# Patient Record
Sex: MALE | Race: WHITE | NOT HISPANIC OR LATINO | Employment: FULL TIME | URBAN - METROPOLITAN AREA
[De-identification: names, ages, dates, MRNs, and addresses within clinical notes are randomized per-mention and may not be internally consistent; named-entity substitution may affect disease eponyms.]

---

## 2019-01-21 ENCOUNTER — OFFICE VISIT (OUTPATIENT)
Dept: URGENT CARE | Facility: CLINIC | Age: 34
End: 2019-01-21
Payer: COMMERCIAL

## 2019-01-21 VITALS
BODY MASS INDEX: 33.57 KG/M2 | SYSTOLIC BLOOD PRESSURE: 120 MMHG | RESPIRATION RATE: 16 BRPM | OXYGEN SATURATION: 97 % | TEMPERATURE: 98.1 F | DIASTOLIC BLOOD PRESSURE: 76 MMHG | WEIGHT: 270 LBS | HEART RATE: 76 BPM | HEIGHT: 75 IN

## 2019-01-21 DIAGNOSIS — M54.42 ACUTE LEFT-SIDED LOW BACK PAIN WITH LEFT-SIDED SCIATICA: ICD-10-CM

## 2019-01-21 PROCEDURE — 99204 OFFICE O/P NEW MOD 45 MIN: CPT | Performed by: PHYSICIAN ASSISTANT

## 2019-01-21 RX ORDER — KETOROLAC TROMETHAMINE 30 MG/ML
60 INJECTION, SOLUTION INTRAMUSCULAR; INTRAVENOUS ONCE
Status: COMPLETED | OUTPATIENT
Start: 2019-01-21 | End: 2019-01-21

## 2019-01-21 RX ORDER — METHYLPREDNISOLONE 4 MG/1
TABLET ORAL
Qty: 1 KIT | Refills: 0 | Status: SHIPPED | OUTPATIENT
Start: 2019-01-21 | End: 2019-01-29

## 2019-01-21 RX ORDER — HYDROCODONE BITARTRATE AND ACETAMINOPHEN 5; 325 MG/1; MG/1
1 TABLET ORAL EVERY 4 HOURS PRN
Qty: 18 TAB | Refills: 0 | Status: SHIPPED | OUTPATIENT
Start: 2019-01-21 | End: 2019-01-24

## 2019-01-21 RX ADMIN — KETOROLAC TROMETHAMINE 60 MG: 30 INJECTION, SOLUTION INTRAMUSCULAR; INTRAVENOUS at 08:50

## 2019-01-21 ASSESSMENT — ENCOUNTER SYMPTOMS
BOWEL INCONTINENCE: 0
PERIANAL NUMBNESS: 0
SHORTNESS OF BREATH: 0
FEVER: 0
HEADACHES: 0
SENSORY CHANGE: 0
BACK PAIN: 1
TINGLING: 0
TREMORS: 0
COUGH: 0
PALPITATIONS: 0
SEIZURES: 0
CHILLS: 0
SPEECH CHANGE: 0
FOCAL WEAKNESS: 0
NUMBNESS: 0
LOSS OF CONSCIOUSNESS: 0
BLURRED VISION: 0
DIZZINESS: 0
DOUBLE VISION: 0
LEG PAIN: 1

## 2019-01-21 NOTE — LETTER
January 21, 2019         Patient: Jesus Reynoso   YOB: 1985   Date of Visit: 1/21/2019           To Whom it May Concern:    Jesus Reynoso was seen in my clinic on 1/21/2019.  Please excuse him from work this week.  If you have any questions or concerns, please don't hesitate to call.        Sincerely,           Maximino Gonsales P.A.-C.  Electronically Signed

## 2019-01-21 NOTE — PROGRESS NOTES
Subjective:      Jesus Reynoso is a 33 y.o. male who presents with Back Pain (x3 weeks, last week has gotten worse, severe pain in lower back that shoots down legs, no injury)            Back Pain   This is a new problem. The current episode started 1 to 4 weeks ago. The problem occurs constantly. The problem is unchanged. The pain is present in the lumbar spine. Associated symptoms include leg pain. Pertinent negatives include no bladder incontinence, bowel incontinence, chest pain, fever, headaches, numbness, perianal numbness or tingling. He has tried NSAIDs for the symptoms. The treatment provided no relief.       Review of Systems   Constitutional: Negative for chills and fever.   Eyes: Negative for blurred vision and double vision.   Respiratory: Negative for cough and shortness of breath.    Cardiovascular: Negative for chest pain and palpitations.   Gastrointestinal: Negative for bowel incontinence.   Genitourinary: Negative for bladder incontinence.   Musculoskeletal: Positive for back pain.   Skin: Negative for rash.   Neurological: Negative for dizziness, tingling, tremors, sensory change, speech change, focal weakness, seizures, loss of consciousness, numbness and headaches.   All other systems reviewed and are negative.    PMH:  has no past medical history on file.  MEDS:   Current Outpatient Prescriptions:   •  HYDROcodone-acetaminophen (NORCO) 5-325 MG Tab per tablet, Take 1 Tab by mouth every four hours as needed for up to 3 days., Disp: 18 Tab, Rfl: 0  •  MethylPREDNISolone (MEDROL DOSEPAK) 4 MG Tablet Therapy Pack, Take as directed on package., Disp: 1 Kit, Rfl: 0  ALLERGIES: No Known Allergies  SURGHX: History reviewed. No pertinent surgical history.  SOCHX:  reports that he has never smoked. He has never used smokeless tobacco. He reports that he drinks alcohol. He reports that he does not use drugs.  FH: Family history was reviewed, no pertinent findings to report  Medications, Allergies, and  "current problem list reviewed today in Epic       Objective:     /76   Pulse 76   Temp 36.7 °C (98.1 °F)   Resp 16   Ht 1.905 m (6' 3\")   Wt 122.5 kg (270 lb)   SpO2 97%   BMI 33.75 kg/m²      Physical Exam   Constitutional: He is oriented to person, place, and time. He appears well-developed and well-nourished.  Non-toxic appearance. He does not have a sickly appearance. He does not appear ill. No distress.   HENT:   Head: Normocephalic and atraumatic.   Right Ear: External ear normal.   Left Ear: External ear normal.   Eyes: Conjunctivae and EOM are normal.   Neck: Normal range of motion. Neck supple.   Cardiovascular: Normal rate, regular rhythm, normal heart sounds, intact distal pulses and normal pulses.    Pulmonary/Chest: Effort normal and breath sounds normal.   Musculoskeletal: He exhibits tenderness. He exhibits no edema or deformity.   + Left leg raise.  Limited ROM.  No PTP of the lumbar spine.  Neurovascularly intact distally from injury.     Neurological: He is alert and oriented to person, place, and time. He has normal reflexes. He displays normal reflexes. He exhibits normal muscle tone. Coordination normal.   Skin: Skin is warm and dry. He is not diaphoretic.   Psychiatric: He has a normal mood and affect. His behavior is normal. Judgment and thought content normal.   Vitals reviewed.              Assessment/Plan:   Pt is a 33 yr old male who presents with severe low back pain radiating to his left leg for 3 weeks.  Rapidly worsening in the past few days.  No mechanism of injury.  No previous injury. Denies bowel incontinence or urinary retention.  Has tried NSAIDS with no help.  Pt appears very uncomfortable.  No PTP of the lumbar region.  + straight leg raise.  Very possible this is sciatica with disc herniation.  Will treat pain while referral to physiatry is pending.     1. Acute left-sided low back pain with left-sided sciatica    - HYDROcodone-acetaminophen (NORCO) 5-325 MG Tab " per tablet; Take 1 Tab by mouth every four hours as needed for up to 3 days.  Dispense: 18 Tab; Refill: 0  - ketorolac (TORADOL) injection 60 mg; 2 mL by Intramuscular route Once.  - MethylPREDNISolone (MEDROL DOSEPAK) 4 MG Tablet Therapy Pack; Take as directed on package.  Dispense: 1 Kit; Refill: 0  - Consent for Opiate Prescription  - REFERRAL TO PHYSIATRY (PMR)    Differential diagnosis, natural history, supportive care discussed. Follow-up with primary care provider within 7-10 days, emergency room precautions discussed.  Patient and/or family appears understanding of information.  Handout and review of patients diagnosis and treatment was discussed extensively.

## 2019-01-22 ENCOUNTER — TELEPHONE (OUTPATIENT)
Dept: PHYSICAL MEDICINE AND REHAB | Facility: MEDICAL CENTER | Age: 34
End: 2019-01-22

## 2019-01-22 ENCOUNTER — OFFICE VISIT (OUTPATIENT)
Dept: PHYSICAL MEDICINE AND REHAB | Facility: MEDICAL CENTER | Age: 34
End: 2019-01-22
Payer: COMMERCIAL

## 2019-01-22 VITALS
OXYGEN SATURATION: 96 % | TEMPERATURE: 98.8 F | WEIGHT: 293.65 LBS | HEIGHT: 75 IN | BODY MASS INDEX: 36.51 KG/M2 | SYSTOLIC BLOOD PRESSURE: 122 MMHG | DIASTOLIC BLOOD PRESSURE: 80 MMHG | HEART RATE: 95 BPM

## 2019-01-22 DIAGNOSIS — M25.552 LEFT HIP PAIN: ICD-10-CM

## 2019-01-22 DIAGNOSIS — M79.18 MYOFASCIAL PAIN: ICD-10-CM

## 2019-01-22 DIAGNOSIS — M54.16 LUMBAR RADICULITIS: ICD-10-CM

## 2019-01-22 DIAGNOSIS — M54.50 LUMBOSACRAL PAIN: ICD-10-CM

## 2019-01-22 DIAGNOSIS — M79.2 NERVE PAIN: ICD-10-CM

## 2019-01-22 DIAGNOSIS — M62.838 MUSCLE SPASM: ICD-10-CM

## 2019-01-22 PROCEDURE — 99204 OFFICE O/P NEW MOD 45 MIN: CPT | Performed by: PHYSICAL MEDICINE & REHABILITATION

## 2019-01-22 RX ORDER — GABAPENTIN 300 MG/1
300 CAPSULE ORAL 2 TIMES DAILY PRN
Qty: 15 CAP | Refills: 1 | Status: SHIPPED | OUTPATIENT
Start: 2019-01-22 | End: 2022-07-15

## 2019-01-22 RX ORDER — BACLOFEN 10 MG/1
10 TABLET ORAL 2 TIMES DAILY PRN
Qty: 15 TAB | Refills: 1 | Status: SHIPPED | OUTPATIENT
Start: 2019-01-22 | End: 2022-07-15

## 2019-01-22 RX ORDER — ACETAMINOPHEN 500 MG
500-1000 TABLET ORAL EVERY 6 HOURS PRN
COMMUNITY
End: 2022-07-15

## 2019-01-22 RX ORDER — IBUPROFEN 600 MG/1
600 TABLET ORAL EVERY 6 HOURS PRN
COMMUNITY
End: 2022-07-15

## 2019-01-22 ASSESSMENT — PATIENT HEALTH QUESTIONNAIRE - PHQ9: CLINICAL INTERPRETATION OF PHQ2 SCORE: 0

## 2019-01-22 ASSESSMENT — ENCOUNTER SYMPTOMS
DOUBLE VISION: 0
PHOTOPHOBIA: 0
PALPITATIONS: 0
CHILLS: 0
HEMOPTYSIS: 0
VOMITING: 0
FEVER: 0
SPUTUM PRODUCTION: 0
SINUS PAIN: 0
TINGLING: 1
NAUSEA: 0
BACK PAIN: 1
SENSORY CHANGE: 1
MYALGIAS: 1

## 2019-01-22 NOTE — TELEPHONE ENCOUNTER
I spoke with [facility] and confirmed that Rx for Norco was picked up yesterday, qty 18 tabs.  I let him know it was not on the NV  today. He said he is not sure why it did not come up.

## 2019-01-22 NOTE — PROGRESS NOTES
Subjective:      Jesus Reynoso is a 33 y.o. male who presents with New Patient    Chief complaint: Low back pain      HPI the patient notes the onset of lumbosacral area pain the proximal 1 month ago without specific event or trauma.  He developed worsening pain and radicular pain, was seen in the urgent care, treatment initiated, reviewed records.    Regarding today's visit:    The patient notes ongoing pain in the left greater than right lumbosacral region.  He notes radiating pain in the left greater than right lower limbs, worse with activities, including standing and walking tolerance.  He notes some pain associated weakness.    The patient notes hip area pain, primary in the left, activity associated    The patient has had prior treatment with medications, including NSAIDs.  He has tried modalities.  No acute changes with bowel/bladder noted.  No acute changes with strength noted.  The ongoing pain is limiting his ability to function.  He is inquiring about additional treatment options      MEDICAL RECORDS REVIEW/DATA REVIEW: Reviewed in epic.    Records Reviewed: Reviewed referring provider notes.     I reviewed medications.  Tried Motrin/NSAIDs.  Now on oral steroid course.  Prescribed hydrocodone #18, from urgent care, filled 1/21/2019, not on  profile.    I reviewed  profile 1/22/2019    I reviewed diagnostic studies:     I reviewed radiographs.  None available for review    I reviewed lab studies.  None available for review    I reviewed medical issues.     I reviewed family history: No neuromuscular disorders noted.    I reviewed social issues.        PAST MEDICAL HISTORY: History reviewed. No pertinent past medical history.    PAST SURGICAL HISTORY:  History reviewed. No pertinent surgical history.    ALLERGIES:  Patient has no known allergies.    MEDICATIONS:    Outpatient Encounter Prescriptions as of 1/22/2019   Medication Sig Dispense Refill   • ibuprofen (MOTRIN) 600 MG Tab  "Take 600 mg by mouth every 6 hours as needed.     • acetaminophen (TYLENOL) 500 MG Tab Take 500-1,000 mg by mouth every 6 hours as needed.     • baclofen (LIORESAL) 10 MG Tab Take 1 Tab by mouth 2 times a day as needed. for muscle spasm 15 Tab 1   • gabapentin (NEURONTIN) 300 MG Cap Take 1 Cap by mouth 2 times a day as needed. for nerve pain 15 Cap 1   • HYDROcodone-acetaminophen (NORCO) 5-325 MG Tab per tablet Take 1 Tab by mouth every four hours as needed for up to 3 days. 18 Tab 0   • MethylPREDNISolone (MEDROL DOSEPAK) 4 MG Tablet Therapy Pack Take as directed on package. 1 Kit 0     No facility-administered encounter medications on file as of 1/22/2019.        SOCIAL HISTORY:    Social History     Social History   • Marital status:      Spouse name: N/A   • Number of children: N/A   • Years of education: N/A     Social History Main Topics   • Smoking status: Never Smoker   • Smokeless tobacco: Current User   • Alcohol use Yes      Comment: occasionally   • Drug use: No   • Sexual activity: Not on file     Other Topics Concern   • Not on file     Social History Narrative   • No narrative on file       Review of Systems   Constitutional: Negative for chills and fever.   HENT: Negative for congestion, ear pain, sinus pain and tinnitus.    Eyes: Negative for double vision and photophobia.   Respiratory: Negative for hemoptysis and sputum production.    Cardiovascular: Negative for palpitations.   Gastrointestinal: Negative for nausea and vomiting.   Genitourinary: Negative for frequency and hematuria.   Musculoskeletal: Positive for back pain, joint pain and myalgias.   Skin: Negative.    Neurological: Positive for tingling and sensory change.   All other systems reviewed and are negative.        Objective:     /80 (BP Location: Left arm, Patient Position: Sitting, BP Cuff Size: Adult)   Pulse 95   Temp 37.1 °C (98.8 °F) (Temporal)   Ht 1.905 m (6' 3\")   Wt (!) 133.2 kg (293 lb 10.4 oz)   SpO2 96% "   BMI 36.70 kg/m²      Physical Exam   Constitutional: Awake, alert, no acute distress  HEENT: Normocephalic atraumatic, neck supple, no JVD noted,  no meningeal signs noted  Lymphadenopathy: no cervical, supraclavicular, or inguinal lymphadenopathy noted  Cardiovascular: Intact distal pulses, including at ankles, no limb swelling noted  Pulmonary: No tachypnea noted, no accessory muscle use noted, no dyspnea noted  Abdominal: Soft, nontender, exhibits no distension, no peritoneal signs, no HSM  Musculoskeletal:   Hip: Tender left greater than right hip region, mild pain with hip range of motion, full range of motion  Lumbar: Tender with palpation left greater than right lumbosacral region, decreased range of motion, pain with testing, straight leg testing produces posterior pelvic and thigh pain on the left, trigger points noted  Neurological: oriented. Cranial nerves grossly intact, normal strength, non-focal.  Normal tone.  Sensation intact distally. Reflexes 1-2+ in  lower limbs, Gait antalgic, steady, reciprocal. Able to heel/toe walk, pain noted, no upper motor neuron signs evident  Skin: Skin is intact. no rashes or lesions noted  Psychiatric: normal mood and affect. speech is normal and behavior is normal.        Assessment/Plan:       ASSESSMENT:    1.  Flare of lumbosacral pain, myofascial pain, lumbar radiculitis, suspect underlying herniated disc versus spondylosis/stenosis    - DX-LUMBAR SPINE-2 OR 3 VIEWS; Future  - REFERRAL TO PHYSICAL THERAPY Reason for Therapy: Eval/Treat/Report, referred urgently  - Reviewed injection therapy with trigger point injections to treat the myofascial component to the ongoing pain, submitted urgent authorization request.  Further goal of injection therapy is to avoid opioid habituation, polypharmacy    2.  Left hip pain, sprain strain    - DX-HIP-COMPLETE - UNILATERAL 2+ LEFT; Future  - REFERRAL TO PHYSICAL THERAPY Reason for Therapy:  Eval/Treat/Report      DISCUSSION/PLAN:    - I discussed management options. I reviewed symptomatic care    - I reviewed home exercise program and activity modification, wrote work note for 1 week    - The patient can consider complementary trials with acupuncture, superficial massage therapy, or TENS unit    - I reviewed medication monitoring.  I reviewed medication adjustments.     - I wrote prescription for:    - baclofen (LIORESAL) 10 MG Tab; Take 1 Tab by mouth 2 times a day as needed. for muscle spasm  Dispense: 15 Tab; Refill: 1  - gabapentin (NEURONTIN) 300 MG Cap; Take 1 Cap by mouth 2 times a day as needed. for nerve pain  Dispense: 15 Cap; Refill: 1    -The patient can consider trial with Lidoderm patch or over-the-counter equivalent, if no contraindication    - For this condition, I recommend the patient avoid/minimize use of opioids/pain medication, prescribed by urgent care    - Note, pain management under acute provisions    - I reviewed risks, side effects, and interactions of medications, including over-the-counter medications. I reviewed further symptomatic medications.    - I reviewed additional diagnostic options, including further/advanced imaging, including MRI lumbar spine, electrodiagnostic testing, vascular studies, and further lab screen    - I reviewed additional therapeutic options, including further injection/interventional therapy and additional consultative input    - Return in 1 week  or an as-needed basis      Please note that this dictation was created using voice recognition software. I have made every reasonable attempt to correct obvious errors but there may be errors of grammar and content that I may have overlooked prior to finalization of this note.

## 2019-01-25 ENCOUNTER — HOSPITAL ENCOUNTER (OUTPATIENT)
Dept: RADIOLOGY | Facility: MEDICAL CENTER | Age: 34
End: 2019-01-25
Attending: PHYSICAL MEDICINE & REHABILITATION
Payer: COMMERCIAL

## 2019-01-25 DIAGNOSIS — M54.50 LUMBOSACRAL PAIN: ICD-10-CM

## 2019-01-25 DIAGNOSIS — M25.552 LEFT HIP PAIN: ICD-10-CM

## 2019-01-25 DIAGNOSIS — M54.16 LUMBAR RADICULITIS: ICD-10-CM

## 2019-01-25 PROCEDURE — 73502 X-RAY EXAM HIP UNI 2-3 VIEWS: CPT | Mod: LT

## 2019-01-25 PROCEDURE — 72100 X-RAY EXAM L-S SPINE 2/3 VWS: CPT

## 2019-01-29 ENCOUNTER — OFFICE VISIT (OUTPATIENT)
Dept: PHYSICAL MEDICINE AND REHAB | Facility: MEDICAL CENTER | Age: 34
End: 2019-01-29
Payer: COMMERCIAL

## 2019-01-29 VITALS
HEIGHT: 75 IN | WEIGHT: 293 LBS | TEMPERATURE: 97.9 F | BODY MASS INDEX: 36.43 KG/M2 | SYSTOLIC BLOOD PRESSURE: 110 MMHG | DIASTOLIC BLOOD PRESSURE: 82 MMHG | HEART RATE: 71 BPM | OXYGEN SATURATION: 97 %

## 2019-01-29 DIAGNOSIS — M79.18 MYOFASCIAL PAIN: ICD-10-CM

## 2019-01-29 DIAGNOSIS — M47.816 LUMBAR SPONDYLOSIS: ICD-10-CM

## 2019-01-29 DIAGNOSIS — M25.559 ARTHRALGIA OF HIP, UNSPECIFIED LATERALITY: ICD-10-CM

## 2019-01-29 DIAGNOSIS — M54.50 LUMBOSACRAL PAIN: ICD-10-CM

## 2019-01-29 PROCEDURE — 20552 NJX 1/MLT TRIGGER POINT 1/2: CPT | Performed by: PHYSICAL MEDICINE & REHABILITATION

## 2019-01-29 PROCEDURE — 99213 OFFICE O/P EST LOW 20 MIN: CPT | Mod: 25 | Performed by: PHYSICAL MEDICINE & REHABILITATION

## 2019-01-29 RX ORDER — METHYLPREDNISOLONE ACETATE 80 MG/ML
80 INJECTION, SUSPENSION INTRA-ARTICULAR; INTRALESIONAL; INTRAMUSCULAR; SOFT TISSUE ONCE
Status: COMPLETED | OUTPATIENT
Start: 2019-01-29 | End: 2019-01-29

## 2019-01-29 RX ADMIN — METHYLPREDNISOLONE ACETATE 80 MG: 80 INJECTION, SUSPENSION INTRA-ARTICULAR; INTRALESIONAL; INTRAMUSCULAR; SOFT TISSUE at 09:53

## 2019-01-29 ASSESSMENT — ENCOUNTER SYMPTOMS
BACK PAIN: 1
FEVER: 0
VOMITING: 0
HEMOPTYSIS: 0
TINGLING: 1
PALPITATIONS: 0
NAUSEA: 0
SPUTUM PRODUCTION: 0
SINUS PAIN: 0
PHOTOPHOBIA: 0
CHILLS: 0
DOUBLE VISION: 0
MYALGIAS: 1
SENSORY CHANGE: 1

## 2019-01-29 ASSESSMENT — PATIENT HEALTH QUESTIONNAIRE - PHQ9: CLINICAL INTERPRETATION OF PHQ2 SCORE: 0

## 2019-01-29 NOTE — PROGRESS NOTES
Subjective:      Jesus Reynoso presents with Follow-Up        HPI Mr. Reynoso returns to the office today for follow-up evaluation of low back and lower limb pain.    The patient notes ongoing pain in the right greater than left lumbosacral region, constant, limiting his ability to function, including standing and walking tolerance.  The patient notes left lower limb radicular symptoms are improved/controlled with care to date.     The patient notes intermittent hip area pain, relatively controlled.    The patient has had prior treatment with medications.  He has tried modalities.  No acute changes with bowel/bladder noted.  No acute changes with strength noted.  He is making an effort with home exercise program.  The ongoing pain limits his ability to function.  He is inquiring about additional treatment options.      MEDICAL RECORDS REVIEW/DATA REVIEW: Reviewed in epic.    I reviewed medications.  Tolerating gabapentin and baclofen, with benefit, primarily using in the evening.  Tried Motrin/NSAIDs.  Completed oral steroid course, had some benefit.     Reviewed  profile 1/29/2019, note prescribed hydrocodone #18, from urgent care visit 1/21/2019, filled 1/21/2019, not noted on  profile.    I reviewed diagnostic studies:     I reviewed radiographs.      Reviewed lumbar spine x-rays 1/2019, I reviewed images and report, showed degenerative disc disease, spondylosis, decreased lordosis not reported on radiologist report    Reviewed left hip x-rays 1/2019, I reviewed images and report, showed impingement findings    I reviewed lab studies.      I reviewed medical issues.     I reviewed family history: No neuromuscular disorders noted.    I reviewed social issues.  , on modified work, tolerated      PAST MEDICAL HISTORY: History reviewed. No pertinent past medical history.    PAST SURGICAL HISTORY:  History reviewed. No pertinent surgical history.    ALLERGIES:  Patient has no known  allergies.    MEDICATIONS:    Outpatient Encounter Prescriptions as of 1/29/2019   Medication Sig Dispense Refill   • ibuprofen (MOTRIN) 600 MG Tab Take 600 mg by mouth every 6 hours as needed.     • acetaminophen (TYLENOL) 500 MG Tab Take 500-1,000 mg by mouth every 6 hours as needed.     • baclofen (LIORESAL) 10 MG Tab Take 1 Tab by mouth 2 times a day as needed. for muscle spasm 15 Tab 1   • gabapentin (NEURONTIN) 300 MG Cap Take 1 Cap by mouth 2 times a day as needed. for nerve pain 15 Cap 1   • [DISCONTINUED] MethylPREDNISolone (MEDROL DOSEPAK) 4 MG Tablet Therapy Pack Take as directed on package. 1 Kit 0     No facility-administered encounter medications on file as of 1/29/2019.        SOCIAL HISTORY:    Social History     Social History   • Marital status:      Spouse name: N/A   • Number of children: N/A   • Years of education: N/A     Social History Main Topics   • Smoking status: Never Smoker   • Smokeless tobacco: Current User   • Alcohol use Yes      Comment: occasionally   • Drug use: No   • Sexual activity: Not on file     Other Topics Concern   •  Service No   • Blood Transfusions No   • Caffeine Concern No   • Occupational Exposure No   • Hobby Hazards No   • Sleep Concern No   • Stress Concern No   • Weight Concern Yes   • Special Diet No   • Back Care Yes   • Exercise Yes   • Bike Helmet No     does not ride bike    • Seat Belt Yes   • Self-Exams Yes     Social History Narrative   • No narrative on file       Review of Systems   Constitutional: Negative for chills and fever.   HENT: Negative for congestion, ear pain, sinus pain and tinnitus.    Eyes: Negative for double vision and photophobia.   Respiratory: Negative for hemoptysis and sputum production.    Cardiovascular: Negative for palpitations.   Gastrointestinal: Negative for nausea and vomiting.   Genitourinary: Negative for frequency and hematuria.   Musculoskeletal: Positive for back pain, joint pain and myalgias.   Skin:  "Negative.    Neurological: Positive for tingling and sensory change.   All other systems reviewed and are negative.  Reviewed, no changes noted     Objective:     /82 (BP Location: Left arm, Patient Position: Sitting, BP Cuff Size: Large adult long)   Pulse 71   Temp 36.6 °C (97.9 °F) (Temporal)   Ht 1.905 m (6' 3\")   Wt (!) 132.9 kg (293 lb)   SpO2 97%   BMI 36.62 kg/m²      Physical Exam   Constitutional: Awake, alert, no acute distress  HEENT: Normocephalic atraumatic, neck supple, no JVD noted,  no meningeal signs noted  Lymphadenopathy: no cervical, supraclavicular, or inguinal lymphadenopathy noted  Cardiovascular: Intact distal pulses, including at ankles, no limb swelling noted  Pulmonary: No tachypnea noted, no accessory muscle use noted, no dyspnea noted  Abdominal: Soft, nontender, exhibits no distension, no peritoneal signs, no HSM  Musculoskeletal:   Hip: Only mild tenderness, only mild pain with range of motion testing  Lumbar: Tender with palpation most prominent right mid lumbar region, lumbar extensor muscle group, trigger points noted, pain with range of motion testing, negative straight leg testing  Neurological: oriented. Cranial nerves grossly intact, normal strength, non-focal.  Normal tone.  Sensation intact distally. Reflexes 1-2+ in  lower limbs, Gait mildly antalgic, steady, reciprocal  Skin: Skin is intact. no rashes or lesions noted  Psychiatric: normal mood and affect. speech is normal and behavior is normal.       Procedure note: Written/informed consent was obtained, risks benefits alternatives discussed, and all questions were answered.  The patient was placed prone in the exam table and 2 trigger points were identified in the right lumbar paraspinal muscles, right lumbar extensor muscle group.  The areas were marked, then sterilely prepared.  Following local skin anesthesia using a 25-gauge needle, trigger point injections were performed with injection of 2 cc of a " mixture of 1 cc of Depo-Medrol 80 mg/cc NDC [id number] and 3 cc of 1% lidocaine was injected at each site.  The patient tolerated the procedure well.  There were no complications.       Assessment/Plan:       ASSESSMENT:    1.  Flare of lumbosacral pain, myofascial pain, intermittent lumbar radiculitis, degenerative disc disease, spondylosis    - I reviewed postprocedure precautions  - Initiate physical therapy, previously ordered, scheduled    2.  Hip pain, sprain strain, impingement    - Initiate physical therapy, previously ordered, scheduled      DISCUSSION/PLAN:    - I discussed management options. I reviewed symptomatic care    - I reviewed home exercise program and activity modification, wrote work note for 3 week    - The patient can consider complementary trials with acupuncture, superficial massage therapy, or TENS unit    - I reviewed medication monitoring.  For now, continue current medications.  I reviewed medication adjustments, has active prescriptions for baclofen and gabapentin.    -The patient can consider trial with Lidoderm patch or over-the-counter equivalent, if no contraindication    - For this condition, I recommend the patient avoid/minimize use of opioids/pain medication, previously prescribed by urgent care    - Note, pain management under acute provisions    - I reviewed risks, side effects, and interactions of medications, including over-the-counter medications. I reviewed further symptomatic medications.    - I reviewed additional diagnostic options, including further/advanced imaging, including MRI lumbar spine, electrodiagnostic testing, vascular studies, and further lab screen    - I reviewed additional therapeutic options, including further injection/interventional therapy and additional consultative input    - Return in 3 week  or an as-needed basis      Please note that this dictation was created using voice recognition software. I have made every reasonable attempt to correct  obvious errors but there may be errors of grammar and content that I may have overlooked prior to finalization of this note.

## 2019-02-05 ENCOUNTER — PHYSICAL THERAPY (OUTPATIENT)
Dept: PHYSICAL THERAPY | Facility: REHABILITATION | Age: 34
End: 2019-02-05
Attending: PHYSICAL MEDICINE & REHABILITATION
Payer: COMMERCIAL

## 2019-02-05 DIAGNOSIS — M54.50 LUMBOSACRAL PAIN: ICD-10-CM

## 2019-02-05 DIAGNOSIS — M54.16 LUMBAR RADICULITIS: ICD-10-CM

## 2019-02-05 DIAGNOSIS — M25.552 LEFT HIP PAIN: ICD-10-CM

## 2019-02-05 PROCEDURE — 97110 THERAPEUTIC EXERCISES: CPT

## 2019-02-05 PROCEDURE — 97161 PT EVAL LOW COMPLEX 20 MIN: CPT

## 2019-02-05 ASSESSMENT — ENCOUNTER SYMPTOMS
PAIN SCALE AT HIGHEST: 7
QUALITY: TINGLING
QUALITY: SHARP
PAIN SCALE AT LOWEST: 3
QUALITY: NUMBNESS
QUALITY: STABBING
PAIN SCALE: 5

## 2019-02-05 NOTE — OP THERAPY EVALUATION
Outpatient Physical Therapy  INITIAL EVALUATION    [facility]  [address]  [facility] [address]  Phone:  [phone]  Fax:  [phone]    Date of Evaluation: 2019    Patient: Jesus Reynoso  YOB: 1985  MRN: [id number]     Referring Provider: Audi Sandoval M.D.  [address] [facility] [address]  [place] [address]   Referring Diagnosis Left hip pain [M25.552];Lumbosacral pain [M54.5];Lumbar radiculitis [M54.16]     Time Calculation  Start time: 0245  Stop time: 0345 Time Calculation (min): 60 minutes     Physical Therapy Occurrence Codes    Date of onset of impairment:  19   Date physical therapy care plan established or reviewed:  19   Date physical therapy treatment started:  19          Chief Complaint: Back Problem    Visit Diagnoses     ICD-10-CM   1. Left hip pain M25.552   2. Lumbosacral pain M54.5   3. Lumbar radiculitis M54.16         Subjective:   History of Present Illness:     Mechanism of injury:  Jesus Reynoso is a 33 y.o. male that presents to therapy with back and leg pain for about a month. His pain came on with insidious onset. His pain is located across his entire low back with pain extending down the back of his Left leg sometimes into this calf. He reports numbness and tingling down the left leg as well that extends to his calf. Pain down the leg is exacerbated with standing and walking and relieved with sitting. Pt installs cabinets for work.     Aggravating factors: walking, standing, maintained positions,   Releiving factors: sitting to a degree.     ADL limitations: limited ability to walk and stand,limited lifting baility due to pain. Difficulty with ADLS like dressing due to pain.     Pain:     Current pain ratin    At best pain rating:  3    At worst pain ratin    Quality:  Sharp, numbness, tingling and stabbing      History reviewed. No pertinent past medical history.  History reviewed. No pertinent  surgical history.  Social History   Substance Use Topics   • Smoking status: Never Smoker   • Smokeless tobacco: Current User   • Alcohol use Yes      Comment: occasionally     Family and Occupational History     Social History   • Marital status:      Spouse name: N/A   • Number of children: N/A   • Years of education: N/A       Objective     Hip Screen   Hip range of motion within functional limits.  Hip strength within functional limits  Hip joint mobility within functional limits    Neurological Testing     Reflexes   Left   Patellar (L4): normal (2+)  Achilles (S1): normal (2+)  Ankle clonus reflex: negative    Right   Patellar (L4): normal (2+)  Achilles (S1): normal (2+)  Ankle clonus reflex: negative    Myotome testing   Lumbar (left)   All left lumbar myotomes within normal limits    Lumbar (right)   All right lumbar myotomes within normal limits    Dermatome testing   Lumbar (left)   All left lumbar dermatomes intact    Lumbar (right)   All right lumbar dermatomes intact    Active Range of Motion     Lumbar   Flexion: decreased (50%)  Extension: decreased (50%)  Left lateral flexion: within functional limits  Right lateral flexion: within functional limits  Left rotation: within functional limits  Right rotation: within functional limits    Additional Active Range of Motion Details  Pt can reach knees in standing with increased pain    Joint Play   Spine     Central PA [place]        L1: WFL       L2: WFL       L3: WFL       L4: WFL       L5: WFL       S1: WFL    Unilateral PA Glide (left)        L1: WFL       L2: painful and hypomobile       L3: painful and hypomobile       L4: painful and hypomobile       L5: painful and hypomobile    Unilateral PA Glide (right)        L1: hypomobile       L2: hypomobile       L3: hypomobile       L4: hypomobile       L5: hypomobile        Strength:      Abdominals   Lower abdominals: Able to initiate but not maintain neutral    Lower extremities   Normal left lower  extremity strength  Normal right lower extremity strength    Tests       Lumbar spine (left)      Positive slump.   Lumbar spine (right)     Negative slump.     Left Pelvic Girdle/Sacrum   Negative: sacral rotation, sacral thrust and thigh thrust.     Right Pelvic Girdle/Sacrum   Negative: sacral rotation, sacral thrust and thigh thrust.     Left Hip   Negative Gaenslen's, SI compression and SI distraction.   SLR: Positive.     Right Hip   Negative Gaenslen's, SI compression and SI distraction.   SLR: Positive.     General Comments     Spine Comments   Repeated passive extensions peripheralization of lumbar symptoms to the L buttocks.         Therapeutic Exercises (CPT 43157):       Therapeutic Exercise Summary: HEP instruction/performance and development. Handout provided and exercises located below:  Access Code: [email or web address]   URL: [email or web address]   Date: 02/05/2019   Prepared by: Maximino Benitez      Exercises  · Supine Pelvic Tilt - 20 reps - 2 sets - 2x daily - 7x weekly  · Supine Lower Trunk Rotation - 15 reps - 2 sets - 2x daily - 7x weekly  · Cat-Camel - 20 reps - 2 sets - 3x daily - 7x weekly      Time-based treatments/modalities:  Therapeutic exercise minutes (CPT 32226): 15 minutes       Assessment, Response and Plan:   Assessment details:  Jesus Reynoso is a 33 y.o. male with signs and symptoms consistent with lumbar radiculopathy of the left side without strength deficits. He requires skilled physical therapy intervention to decrease pain, increase range of motion, increase functional mobility, improve ADL completion and establish a home exercise program.  Goals:   Short Term Goals:   1. Patient will be Independent with prescribed Home Exercise Program (HEP) and will be able to demonstrate exercises without cues for improved overall symptoms/activity tolerance.   2. Pt will improve ability to stand and walk for 15 minutes without increased pain down the leg.   3. Pt to have no symptoms past the  calf for 2 consecutive days.   Short term goal time span:  2-4 weeks      Long Term Goals:    4. Pt will improve ability to roll over in bed with pain less than 3/10.   5. Pt will improve LEFS score to greater than 40/80 indicative of improved function and reduced perceived disability.  Long term goal time span:  4-6 weeks    Plan:   Planned therapy interventions:  Therapeutic Exercise (CPT 08275), Therapeutic Activities (CPT 08767), Manual Therapy (CPT 31388), Neuromuscular Re-education (CPT 10255), E Stim Unattended (CPT 43359) and Mechanical Traction (CPT 09459)  Frequency:  2x week  Duration in weeks:  6  Discussed with:  Patient    Functional Limitations and Severity Modifiers  PT Functional Assessment Tool Used: LEFS  PT Functional Assessment Score: 23/80     Referring provider co-signature:  I have reviewed this plan of care and my co-signature certifies the need for services.  Certification Dates:   From 02/05/19     To 3/19/19    Physician Signature: ________________________________ Date: ______________

## 2019-02-07 ENCOUNTER — APPOINTMENT (OUTPATIENT)
Dept: PHYSICAL MEDICINE AND REHAB | Facility: MEDICAL CENTER | Age: 34
End: 2019-02-07
Payer: COMMERCIAL

## 2019-02-19 ENCOUNTER — OFFICE VISIT (OUTPATIENT)
Dept: PHYSICAL MEDICINE AND REHAB | Facility: MEDICAL CENTER | Age: 34
End: 2019-02-19
Payer: COMMERCIAL

## 2019-02-19 VITALS
HEART RATE: 71 BPM | DIASTOLIC BLOOD PRESSURE: 78 MMHG | HEIGHT: 75 IN | OXYGEN SATURATION: 97 % | BODY MASS INDEX: 36.7 KG/M2 | TEMPERATURE: 98.8 F | WEIGHT: 295.2 LBS | SYSTOLIC BLOOD PRESSURE: 132 MMHG

## 2019-02-19 DIAGNOSIS — M25.559 ARTHRALGIA OF HIP, UNSPECIFIED LATERALITY: ICD-10-CM

## 2019-02-19 DIAGNOSIS — M54.16 LUMBAR RADICULITIS: ICD-10-CM

## 2019-02-19 DIAGNOSIS — M79.18 MYOFASCIAL PAIN: ICD-10-CM

## 2019-02-19 DIAGNOSIS — M54.50 LUMBOSACRAL PAIN: ICD-10-CM

## 2019-02-19 PROCEDURE — 99212 OFFICE O/P EST SF 10 MIN: CPT | Performed by: PHYSICAL MEDICINE & REHABILITATION

## 2019-02-19 ASSESSMENT — ENCOUNTER SYMPTOMS
SENSORY CHANGE: 1
PALPITATIONS: 0
MYALGIAS: 1
SINUS PAIN: 0
DOUBLE VISION: 0
TINGLING: 1
HEMOPTYSIS: 0
SPUTUM PRODUCTION: 0
BACK PAIN: 1
PHOTOPHOBIA: 0
VOMITING: 0
FEVER: 0
CHILLS: 0
NAUSEA: 0

## 2019-02-19 ASSESSMENT — PATIENT HEALTH QUESTIONNAIRE - PHQ9: CLINICAL INTERPRETATION OF PHQ2 SCORE: 0

## 2019-02-19 NOTE — PROGRESS NOTES
Subjective:      Jesus Reynoso presents with Follow-Up        HPI Mr. Reynoso returns to the office today for follow-up evaluation of low back and lower limb pain.    The patient notes the trigger point injections performed the last visit were helpful, tolerated.  Additionally he started physical therapy, notes some benefit, further sessions remaining.    The patient notes ongoing lumbosacral pain, although now more localized to the lumbosacral region, now primarily activity associated, radicular pain controlled/resolved.  He has had some improvement with his function.    He notes intermittent hip area pain, controlled.    No significant mid back pain noted    The patient has had prior treatment with medications.  He is working with physical therapy.  He has tried modalities.  No acute changes with bowel/bladder noted.  No acute changes with strength noted.  He is making an effort with home exercise program.  He is pleased by the echo mental progress, returns to further discuss management options      MEDICAL RECORDS REVIEW/DATA REVIEW: Reviewed in epic.    I reviewed medications.  Prescribed gabapentin and baclofen, not needing/using. Tried Motrin/NSAIDs.  Completed oral steroid course, had some benefit.     I reviewed diagnostic studies:     I reviewed radiographs.      Reviewed lumbar spine x-rays 1/2019, showed degenerative disc disease, spondylosis, decreased lordosis not reported on radiologist report    Reviewed left hip x-rays 1/2019, showed impingement findings    I reviewed lab studies.      I reviewed medical issues.     I reviewed family history: No neuromuscular disorders noted.    I reviewed social issues.  , able to perform essential job functions      PAST MEDICAL HISTORY: History reviewed. No pertinent past medical history.    PAST SURGICAL HISTORY:  History reviewed. No pertinent surgical history.    ALLERGIES:  Patient has no known allergies.    MEDICATIONS:    Outpatient  Encounter Prescriptions as of 2/19/2019   Medication Sig Dispense Refill   • ibuprofen (MOTRIN) 600 MG Tab Take 600 mg by mouth every 6 hours as needed.     • acetaminophen (TYLENOL) 500 MG Tab Take 500-1,000 mg by mouth every 6 hours as needed.     • baclofen (LIORESAL) 10 MG Tab Take 1 Tab by mouth 2 times a day as needed. for muscle spasm (Patient not taking: Reported on 2/19/2019) 15 Tab 1   • gabapentin (NEURONTIN) 300 MG Cap Take 1 Cap by mouth 2 times a day as needed. for nerve pain (Patient not taking: Reported on 2/19/2019) 15 Cap 1     No facility-administered encounter medications on file as of 2/19/2019.        SOCIAL HISTORY:    Social History     Social History   • Marital status:      Spouse name: N/A   • Number of children: N/A   • Years of education: N/A     Social History Main Topics   • Smoking status: Never Smoker   • Smokeless tobacco: Current User   • Alcohol use Yes      Comment: occasionally   • Drug use: No   • Sexual activity: Not on file     Other Topics Concern   •  Service No   • Blood Transfusions No   • Caffeine Concern No   • Occupational Exposure No   • Hobby Hazards No   • Sleep Concern No   • Stress Concern No   • Weight Concern Yes   • Special Diet No   • Back Care Yes   • Exercise Yes   • Bike Helmet No     does not ride bike    • Seat Belt Yes   • Self-Exams Yes     Social History Narrative   • No narrative on file       Review of Systems   Constitutional: Negative for chills and fever.   HENT: Negative for congestion, ear pain, sinus pain and tinnitus.    Eyes: Negative for double vision and photophobia.   Respiratory: Negative for hemoptysis and sputum production.    Cardiovascular: Negative for palpitations.   Gastrointestinal: Negative for nausea and vomiting.   Genitourinary: Negative for frequency and hematuria.   Musculoskeletal: Positive for back pain, joint pain and myalgias.   Skin: Negative.    Neurological: Positive for tingling and sensory change.  "  All other systems reviewed and are negative.  Reviewed, no changes noted     Objective:     /78   Pulse 71   Temp 37.1 °C (98.8 °F) (Temporal)   Ht 1.905 m (6' 3\")   Wt (!) 133.9 kg (295 lb 3.1 oz)   SpO2 97%   BMI 36.90 kg/m²      Physical Exam   Constitutional: Awake, alert, no acute distress  HEENT: Normocephalic atraumatic, neck supple, no JVD noted,  no meningeal signs noted  Lymphadenopathy: no cervical, supraclavicular, or inguinal lymphadenopathy noted  Cardiovascular: Intact distal pulses, including at ankles, no limb swelling noted  Pulmonary: No tachypnea noted, no accessory muscle use noted, no dyspnea noted  Abdominal: Soft, nontender, exhibits no distension, no peritoneal signs, no HSM  Musculoskeletal:   Lumbar: Mild tenderness with palpation lumbosacral region, mild pain with range of motion testing, trigger points noted, negative straight leg testing  Hip: Only mild tenderness, only mild pain with range of motion test  Neurological: oriented. Cranial nerves grossly intact, normal strength, non-focal.  Normal tone.  Sensation intact distally. Reflexes 1-2+ in lower limbs, Gait mildly antalgic, steady, reciprocal  Skin: Skin is intact. no rashes or lesions noted  Psychiatric: normal mood and affect. speech is normal and behavior is normal.          Assessment/Plan:       ASSESSMENT:    1.  Lumbosacral pain, myofascial pain, history of lumbar radiculitis, now controlled, degenerative disc disease, spondylosis, incrementally symptomatically improving    - Continue with physical therapy  - Reviewed injection therapy with further trigger point injections, if not responding to more conservative care    2.  Hip pain, sprain strain, impingement, relatively symptomatically controlled      DISCUSSION/PLAN:    - I discussed management options. I reviewed symptomatic care    - I reviewed home exercise program.  The patient can return to full duty with precautions    - The patient can consider " complementary trials with acupuncture, superficial massage therapy, or TENS unit    - I reviewed medication monitoring.  For now, continue current medications.  I reviewed medication adjustments, has active prescriptions for baclofen and gabapentin.    -The patient can consider trial with Lidoderm patch or over-the-counter equivalent, if no contraindication    - I reviewed risks, side effects, and interactions of medications, including over-the-counter medications. I reviewed further symptomatic medications.    - I reviewed additional diagnostic options, including further/advanced imaging, including MRI lumbar spine, electrodiagnostic testing, vascular studies, and further lab screen    - I reviewed additional therapeutic options, including further injection/interventional therapy and additional consultative input    - Return in 4-6 weeks or an as-needed basis      Please note that this dictation was created using voice recognition software. I have made every reasonable attempt to correct obvious errors but there may be errors of grammar and content that I may have overlooked prior to finalization of this note.

## 2019-02-21 ENCOUNTER — APPOINTMENT (OUTPATIENT)
Dept: PHYSICAL THERAPY | Facility: REHABILITATION | Age: 34
End: 2019-02-21
Attending: PHYSICAL MEDICINE & REHABILITATION
Payer: COMMERCIAL

## 2019-02-26 ENCOUNTER — APPOINTMENT (OUTPATIENT)
Dept: PHYSICAL THERAPY | Facility: REHABILITATION | Age: 34
End: 2019-02-26
Attending: PHYSICAL MEDICINE & REHABILITATION
Payer: COMMERCIAL

## 2019-02-28 ENCOUNTER — APPOINTMENT (OUTPATIENT)
Dept: PHYSICAL THERAPY | Facility: REHABILITATION | Age: 34
End: 2019-02-28
Attending: PHYSICAL MEDICINE & REHABILITATION
Payer: COMMERCIAL

## 2019-03-13 ENCOUNTER — TELEPHONE (OUTPATIENT)
Dept: PHYSICAL MEDICINE AND REHAB | Facility: MEDICAL CENTER | Age: 34
End: 2019-03-13

## 2019-03-13 NOTE — TELEPHONE ENCOUNTER
"I spoke with Jesus and he said he would like to see another doctor tomorrow if possible.   I let him know I will check with Dr. Penny to see if she could see him tomorrow for office injection at 8am.     Dr. Sandoval wrote, \"We do have trigger point injections authorized on him. If he can not wait until Monday for my evaluation, you can see if Dr. Allen or Dr. Penny can see him this week.     Alternatively, he can go to the ED or urgent care for evaluation.\"        Abraham said he has had a relapse of back pain the last couple days.     I let him know I will check with Dr. Sandoval to what we can do for him since he is out of the office the next couple days.     I will check to see if he would like him to see one of the other doctors in office.     I asked if he has been continuing with physical therapy and he said he could not afford it because of his deductible is $1,700.00, so he stopped.     I will check to see if urgent care/ER may be best.   "

## 2019-03-14 ENCOUNTER — OFFICE VISIT (OUTPATIENT)
Dept: PHYSICAL MEDICINE AND REHAB | Facility: MEDICAL CENTER | Age: 34
End: 2019-03-14
Payer: COMMERCIAL

## 2019-03-14 VITALS
HEIGHT: 75 IN | WEIGHT: 295 LBS | HEART RATE: 69 BPM | TEMPERATURE: 97.3 F | DIASTOLIC BLOOD PRESSURE: 74 MMHG | OXYGEN SATURATION: 97 % | BODY MASS INDEX: 36.68 KG/M2 | SYSTOLIC BLOOD PRESSURE: 114 MMHG

## 2019-03-14 DIAGNOSIS — M79.18 MYOFASCIAL PAIN: ICD-10-CM

## 2019-03-14 DIAGNOSIS — M62.838 MUSCLE SPASM: ICD-10-CM

## 2019-03-14 PROCEDURE — 20552 NJX 1/MLT TRIGGER POINT 1/2: CPT | Performed by: PHYSICAL MEDICINE & REHABILITATION

## 2019-03-14 RX ORDER — METHYLPREDNISOLONE ACETATE 80 MG/ML
80 INJECTION, SUSPENSION INTRA-ARTICULAR; INTRALESIONAL; INTRAMUSCULAR; SOFT TISSUE ONCE
Status: COMPLETED | OUTPATIENT
Start: 2019-03-14 | End: 2019-03-14

## 2019-03-14 RX ORDER — METHYLPREDNISOLONE ACETATE 80 MG/ML
80 INJECTION, SUSPENSION INTRA-ARTICULAR; INTRALESIONAL; INTRAMUSCULAR; SOFT TISSUE ONCE
Status: DISCONTINUED | OUTPATIENT
Start: 2019-03-14 | End: 2019-03-14

## 2019-03-14 RX ADMIN — METHYLPREDNISOLONE ACETATE 80 MG: 80 INJECTION, SUSPENSION INTRA-ARTICULAR; INTRALESIONAL; INTRAMUSCULAR; SOFT TISSUE at 08:33

## 2019-03-14 ASSESSMENT — PAIN SCALES - GENERAL: PAINLEVEL: 6=MODERATE PAIN

## 2019-03-14 NOTE — Clinical Note
I saw Paras Edgardo for repeat trigger point injections.  He will follow-up with you in a few weeks.

## 2019-03-18 ENCOUNTER — APPOINTMENT (OUTPATIENT)
Dept: PHYSICAL MEDICINE AND REHAB | Facility: MEDICAL CENTER | Age: 34
End: 2019-03-18
Payer: COMMERCIAL

## 2019-03-18 NOTE — PROGRESS NOTES
Follow up patient note  Pain Medicine, Interventional spine and sports physiatry, Physical medicine rehabilitation      Chief complaint:   Chief Complaint   Patient presents with   • Follow-Up   Low back pain      HISTORY    Please see new patient note dated 01/22/2019 by Dr. Sandoval,  for more details.     HPI  Patient identification: Jesus Reynoso 33 y.o. male who presents for increased low back pain    Interval history: Mr. Reynoso is a 33 year-old male who has been seeing Dr. Sandoval for low back pain that started in the last few months without any particular event.  He had been making progress with physical therapy and after trigger point injections with Dr. Sandoval on 01/29/2019.      About 3 days ago, he reports that he started having an increase in pain after bending and has had an increase in low back spasms.  He has had to stop going to physical therapy due to the copay cost.  No symptoms into the legs involving either numbness/tingling or weakness. No changes to bowel or bladder function.  He would like to have another trigger point injection.       ROS Red Flags :   Fever, Chills, Sweats: Denies  Involuntary Weight Loss: Denies  Bowel/Bladder Incontinence: Denies  Saddle Anesthesia: Denies        PMHx:   No past medical history on file.    PSHx:   No past surgical history on file.    Family history   Denies neuromuscular disease  No family history on file.      Medications:   Current Outpatient Prescriptions   Medication   • ibuprofen (MOTRIN) 600 MG Tab   • acetaminophen (TYLENOL) 500 MG Tab   • baclofen (LIORESAL) 10 MG Tab   • gabapentin (NEURONTIN) 300 MG Cap     No current facility-administered medications for this visit.        Allergies:   No Known Allergies    Social Hx:   Social History     Social History   • Marital status:      Spouse name: N/A   • Number of children: N/A   • Years of education: N/A     Occupational History   • Not on file.     Social History Main Topics   • Smoking status:  "Never Smoker   • Smokeless tobacco: Current User   • Alcohol use Yes      Comment: occasionally   • Drug use: No   • Sexual activity: Not on file     Other Topics Concern   •  Service No   • Blood Transfusions No   • Caffeine Concern No   • Occupational Exposure No   • Hobby Hazards No   • Sleep Concern No   • Stress Concern No   • Weight Concern Yes   • Special Diet No   • Back Care Yes   • Exercise Yes   • Bike Helmet No     does not ride bike    • Seat Belt Yes   • Self-Exams Yes     Social History Narrative   • No narrative on file         EXAMINATION     Physical Exam:   Vitals: Blood pressure 114/74, pulse 69, temperature 36.3 °C (97.3 °F), temperature source Temporal, height 1.905 m (6' 3\"), weight (!) 133.8 kg (295 lb), SpO2 97 %.    Constitutional:   Body Habitus: Body mass index is 36.87 kg/m².  Cooperation: Fully cooperates with exam  Appearance: Well-groomed no disheveled, in no acute distress    Respiratory-  breathing comfortable on room air, no audible wheezing  Cardiovascular-  No lower extremity edema is noted.   Psychiatric- alert and oriented ×3. Normal affect.   Spine: Muscle spasms noted in the right greater than left lumbar paraspinals.    No focal motor deficits in the lower extremities bilaterally.  Reflexes 2+ patella and achilles bilaterally  Sensation is grossly intact to light touch in the lower extremities bilaterally        MEDICAL DECISION MAKING    DATA    Labs:   No results found for: SODIUM, POTASSIUM, CHLORIDE, CO2, GLUCOSE, BUN, CREATININE, BUNCREATRAT, GLOMRATE     No results found for: PROTHROMBTM, INR     No results found for: WBC, RBC, HEMOGLOBIN, HEMATOCRIT, MCV, MCH, MCHC, MPV, NEUTSPOLYS, LYMPHOCYTES, MONOCYTES, EOSINOPHILS, BASOPHILS, HYPOCHROMIA, ANISOCYTOSIS     No results found for: HBA1C       Imaging: No imaging was reviewed                                                                                DIAGNOSIS   Visit Diagnoses     ICD-10-CM   1. Myofascial " pain M79.18   2. Muscle spasm M62.838         ASSESSMENT and PLAN:     Jesus Reynoso 33 y.o. male  With axial low back pain and muscle spasms/myofascial pain    Jesus was seen today for follow-up.    Diagnoses and all orders for this visit:    Myofascial pain  -     Discontinue: methylPREDNISolone acetate (DEPO-MEDROL) injection 80 mg; 1 mL by Intramuscular route Once.  -     methylPREDNISolone acetate (DEPO-MEDROL) injection 80 mg; 1 mL by Other route Once.    Muscle spasm  -     Discontinue: methylPREDNISolone acetate (DEPO-MEDROL) injection 80 mg; 1 mL by Intramuscular route Once.  -     methylPREDNISolone acetate (DEPO-MEDROL) injection 80 mg; 1 mL by Other route Once.      Discussed plan for trigger point injections.  Risks, benefits and expectations discussed and he would like to proceed with injection.  See procedure note below.    Follow up: With Dr. Sandoval      Please note that this dictation was created using voice recognition software. I have made every reasonable attempt to correct obvious errors but there may be errors of grammar and content that I may have overlooked prior to finalization of this note.      Nilson Penny MD  Interventional Spine and Sports Physiatry  Physical Medicine and Rehabilitation  [facility]      Date of Service: 3/14/2019    Physician/s: Nilson Penny MD    Pre-operative Diagnosis: Muscle spasm, myofascial pain    Post-operative Diagnosis: Muscle spasm, myofascial pain    Procedure: right and left lumbar paraspinal trigger point injections    Description of procedure:    The risks, benefits, and alternatives of the procedure were reviewed and discussed with the patient.  Written informed consent was freely obtained. A pre-procedural time-out was conducted by the physician verifying patient’s identity, procedure to be performed, procedure site and side, and allergy verification. Appropriate equipment was determined to be in place for the procedure.     In the office  suite exam room the patient was placed in a prone position and the skin areas for injection over the lumbar paraspinals were marked in two locations on the right and one location in the left paraspinal muscles. The areas of pain was then prepped and draped in the usual sterile fashion. A 25g 3.5 inch needle was placed into each of the markings at the areas above.. After negative aspiration, approximately a 3 mL of a solution containing 5cc of 1% lidocaine, 1cc of depomedrol (80mg/ml) and 3 mL of sterile saline was injected into the each areas above. The needle was removed intact after each trigger point injection, and the patient's back was covered with a 4x4 gauze, the area was cleansed with sterile normal saline, and a dressing was applied. There were no complications noted.     He noted improvement in his back pain at the time of discharge.    Nilson Penny MD  Physical Medicine and Rehabilitation  Interventional Spine and Sports Physiatry  [facility]

## 2019-03-20 ENCOUNTER — TELEPHONE (OUTPATIENT)
Dept: PHYSICAL THERAPY | Facility: REHABILITATION | Age: 34
End: 2019-03-20

## 2019-03-20 NOTE — OP THERAPY DISCHARGE SUMMARY
Outpatient Physical Therapy  DISCHARGE SUMMARY NOTE      [facility]  [address], [address]  [facility] [address]  Phone:  [phone]  Fax:  [phone]    Date of Visit: 03/20/2019    Patient: Jesus Reynoso  YOB: 1985  MRN: [id number]     Referring Provider: Audi Sandoval M.D.   Referring Diagnosis Left hip pain [M25.552];Lumbosacral pain [M54.5];Lumbar radiculitis [M54.16]     Physical Therapy Occurrence Codes    Date of onset of impairment:  1/5/19   Date physical therapy care plan established or reviewed:  2/5/19   Date physical therapy treatment started:  2/5/19            Your patient is being discharged from Physical Therapy with the following comments:   · Patient has failed to schedule or reschedule follow-up visits  · Patient has been non-compliant with physical therapy plan of care    Comments:  Jesus Reynoso has been discharged due to a lapse in care greater than 30 days. Thank you for the opportunity to assist you and your patient.     Limitations Remaining:  See evaluation    Recommendations:  See evaluation    Maximino Benitez, PT, DPT    Date: 3/20/2019

## 2019-04-11 ENCOUNTER — OFFICE VISIT (OUTPATIENT)
Dept: URGENT CARE | Facility: PHYSICIAN GROUP | Age: 34
End: 2019-04-11
Payer: COMMERCIAL

## 2019-04-11 VITALS
WEIGHT: 260 LBS | TEMPERATURE: 99.3 F | HEIGHT: 75 IN | RESPIRATION RATE: 14 BRPM | OXYGEN SATURATION: 97 % | HEART RATE: 82 BPM | BODY MASS INDEX: 32.33 KG/M2 | SYSTOLIC BLOOD PRESSURE: 138 MMHG | DIASTOLIC BLOOD PRESSURE: 90 MMHG

## 2019-04-11 DIAGNOSIS — H57.8A9 SENSATION OF FOREIGN BODY IN EYE: ICD-10-CM

## 2019-04-11 PROCEDURE — 99214 OFFICE O/P EST MOD 30 MIN: CPT | Performed by: PHYSICIAN ASSISTANT

## 2019-04-11 RX ORDER — ERYTHROMYCIN 5 MG/G
OINTMENT OPHTHALMIC
Qty: 1 TUBE | Refills: 0 | Status: SHIPPED | OUTPATIENT
Start: 2019-04-11 | End: 2022-07-15

## 2019-04-11 ASSESSMENT — ENCOUNTER SYMPTOMS
VOMITING: 0
NAUSEA: 0
EYE PAIN: 1
CHILLS: 0
DOUBLE VISION: 0
FEVER: 0
BLURRED VISION: 0
COUGH: 0
EYE REDNESS: 1
EYE DISCHARGE: 1
PHOTOPHOBIA: 0
SHORTNESS OF BREATH: 0

## 2019-04-12 NOTE — PROGRESS NOTES
"Subjective:   Jesus Reynoso is a 33 y.o. male who presents for Foreign Body in Eye (sawdust)        Patient notes today he was carrying a table saw when the wind picked up and blew sawdust into his left eye.  He complains of foreign body sensation left eye since.  He has tried over-the-counter irrigation kits he is concerned with some rubbing of left eye.  He reports normal vision but complains of tearing and irritation.  Notes foreign body sensation to lower outer lid.      Other   Pertinent negatives include no chills, coughing, fever, nausea, rash or vomiting.     Review of Systems   Constitutional: Negative for chills and fever.   Eyes: Positive for pain ( Irritation, foreign body sensation), discharge ( Clear) and redness. Negative for blurred vision, double vision and photophobia.   Respiratory: Negative for cough and shortness of breath.    Gastrointestinal: Negative for nausea and vomiting.   Skin: Negative for rash.     No Known Allergies   Objective:   /90   Pulse 82   Temp 37.4 °C (99.3 °F)   Resp 14   Ht 1.905 m (6' 3\")   Wt 117.9 kg (260 lb)   SpO2 97%   BMI 32.50 kg/m²   Physical Exam   Constitutional: He is oriented to person, place, and time. He appears well-developed and well-nourished. No distress.   HENT:   Head: Normocephalic and atraumatic.   Right Ear: External ear normal.   Left Ear: External ear normal.   Nose: Nose normal.   Eyes: Pupils are equal, round, and reactive to light. EOM are normal. Right eye exhibits no discharge. No foreign body present in the right eye. Left eye exhibits no discharge. Foreign body present in the left eye. Right conjunctiva is not injected. Left conjunctiva is injected. No scleral icterus.   Small clear linear appearing foreign body lifted off lower everted lid with cotton tip applicator, negative fluorescein uptake, left eye irrigated with sterile saline   Neck: Neck supple.   Pulmonary/Chest: Effort normal. No respiratory distress. " "  Musculoskeletal: Normal range of motion.   Neurological: He is alert and oriented to person, place, and time. Coordination normal.   Skin: Skin is warm and dry. He is not diaphoretic. No pallor.   Psychiatric: He has a normal mood and affect.   Nursing note and vitals reviewed.        Assessment/Plan:   1. Sensation of foreign body in eye  - erythromycin 5 MG/GM Ointment; Apply 1/2\" ribbon to lower lid of affected eye 3-4x's/day x 5days  Dispense: 1 Tube; Refill: 0  Supportive care is reviewed with patient/caregiver - recommend to use topical erythromycin ointment to aid lubricant effect, with return of irritating pain patient is directed to emergency department for further care tonight-he does feel resolution of foreign body sensation in the left eye (I do remind him he has received some numbing medicine to the left eye) - ER precautions with any worsening symptoms are reviewed with patient/caregiver and they do express understanding    Differential diagnosis, natural history, supportive care, and indications for immediate follow-up discussed.       "

## 2019-10-14 ENCOUNTER — HOSPITAL ENCOUNTER (OUTPATIENT)
Dept: RADIOLOGY | Facility: MEDICAL CENTER | Age: 34
End: 2019-10-14
Attending: NURSE PRACTITIONER
Payer: COMMERCIAL

## 2019-10-14 ENCOUNTER — OFFICE VISIT (OUTPATIENT)
Dept: URGENT CARE | Facility: PHYSICIAN GROUP | Age: 34
End: 2019-10-14
Payer: COMMERCIAL

## 2019-10-14 VITALS
OXYGEN SATURATION: 97 % | HEIGHT: 75 IN | HEART RATE: 82 BPM | SYSTOLIC BLOOD PRESSURE: 124 MMHG | TEMPERATURE: 97.8 F | DIASTOLIC BLOOD PRESSURE: 70 MMHG | BODY MASS INDEX: 38.05 KG/M2 | WEIGHT: 306 LBS

## 2019-10-14 DIAGNOSIS — S79.911A INJURY OF RIGHT HIP, INITIAL ENCOUNTER: ICD-10-CM

## 2019-10-14 DIAGNOSIS — M25.551 ACUTE HIP PAIN, RIGHT: ICD-10-CM

## 2019-10-14 PROCEDURE — 99214 OFFICE O/P EST MOD 30 MIN: CPT | Performed by: NURSE PRACTITIONER

## 2019-10-14 PROCEDURE — 73502 X-RAY EXAM HIP UNI 2-3 VIEWS: CPT | Mod: RT

## 2019-10-14 RX ORDER — IBUPROFEN 800 MG/1
800 TABLET ORAL EVERY 8 HOURS PRN
Qty: 30 TAB | Refills: 0 | Status: SHIPPED | OUTPATIENT
Start: 2019-10-14 | End: 2022-07-15

## 2019-10-14 ASSESSMENT — ENCOUNTER SYMPTOMS
HIP PAIN: 1
SENSORY CHANGE: 0
NEUROLOGICAL NEGATIVE: 1
CONSTITUTIONAL NEGATIVE: 1

## 2019-10-14 ASSESSMENT — PAIN SCALES - GENERAL: PAINLEVEL: 8=MODERATE-SEVERE PAIN

## 2019-10-14 NOTE — PROGRESS NOTES
"Subjective:     Jesus Reynoso is a 34 y.o. male who presents for Hip Pain (pop in right hip, swelling, )       Hip Pain   This is a new problem. The problem has been gradually worsening.     Patient reports that yesterday he was in [place] at a heavy metal concert.  He reports that the crowds have been pushing and shoving each other.  He reports that he was pushing back when he felt a pop in his right hip.  Reports right hip pain, swelling, tenderness, and limited range of motion of all planes.  Has been able to walk although it is painful.  Prior therapy: None.  Denies previous injury to the right hip.    PMH:  has no past medical history on file.    MEDS:   Current Outpatient Medications:   •  ibuprofen (MOTRIN) 800 MG Tab, Take 1 Tab by mouth every 8 hours as needed for Moderate Pain., Disp: 30 Tab, Rfl: 0  •  erythromycin 5 MG/GM Ointment, Apply 1/2\" ribbon to lower lid of affected eye 3-4x's/day x 5days (Patient not taking: Reported on 10/14/2019), Disp: 1 Tube, Rfl: 0  •  ibuprofen (MOTRIN) 600 MG Tab, Take 600 mg by mouth every 6 hours as needed., Disp: , Rfl:   •  acetaminophen (TYLENOL) 500 MG Tab, Take 500-1,000 mg by mouth every 6 hours as needed., Disp: , Rfl:   •  baclofen (LIORESAL) 10 MG Tab, Take 1 Tab by mouth 2 times a day as needed. for muscle spasm (Patient not taking: Reported on 2/19/2019), Disp: 15 Tab, Rfl: 1  •  gabapentin (NEURONTIN) 300 MG Cap, Take 1 Cap by mouth 2 times a day as needed. for nerve pain (Patient not taking: Reported on 10/14/2019), Disp: 15 Cap, Rfl: 1    ALLERGIES: No Known Allergies    SURGHX: History reviewed. No pertinent surgical history.    SOCHX:  reports that he has never smoked. He uses smokeless tobacco. He reports that he drinks alcohol. He reports that he does not use drugs.     FH: Reviewed with patient, not pertinent to this visit.    Review of Systems   Constitutional: Negative.    Musculoskeletal:        Right hip pain   Neurological: Negative.  " "Negative for sensory change.   All other systems reviewed and are negative.    Objective:     /70   Pulse 82   Temp 36.6 °C (97.8 °F)   Ht 1.905 m (6' 3\")   Wt (!) 138.8 kg (306 lb)   SpO2 97%   BMI 38.25 kg/m²     Physical Exam   Constitutional: He is oriented to person, place, and time. He appears well-developed and well-nourished. He is cooperative.  Non-toxic appearance. No distress.   HENT:   Head: Normocephalic.   Right Ear: External ear normal.   Left Ear: External ear normal.   Nose: Nose normal.   Eyes: Conjunctivae and EOM are normal.   Neck: Normal range of motion.   Cardiovascular: Normal rate and intact distal pulses.   Pulmonary/Chest: Effort normal. No respiratory distress.   Musculoskeletal: He exhibits no deformity.        Right hip: He exhibits decreased range of motion, decreased strength and tenderness. He exhibits no swelling, no deformity and no laceration.   Neurological: He is alert and oriented to person, place, and time. He has normal strength. No sensory deficit. Coordination normal.   Skin: Skin is warm and dry. He is not diaphoretic. No pallor.   Psychiatric: He has a normal mood and affect. His behavior is normal.   Vitals reviewed.    X-ray of R hip:    Details     Reading Physician Reading Date Result Priority   Serafin Easton M.D. 10/14/2019 [facility]      Narrative       10/14/2019 12:09 PM    HISTORY/REASON FOR EXAM:  Pelvic/Hip Pain Following Trauma.    TECHNIQUE/EXAM DESCRIPTION AND NUMBER OF VIEWS:  2 views of the RIGHT hip.    COMPARISON: 1/25/2019    FINDINGS:  Femoral heads are seated within the acetabula. Joint spaces are maintained. No fracture or dislocation is seen. There is mild osseous protuberance of the femoral head neck junctions which can predispose to impingement. Small os acetabuli are seen   bilaterally. There is no diastases of the symphysis pubis. Calcific densities in the pelvis likely represent phleboliths.      Impression       Small bony " protuberance of the femoral head neck junctions bilaterally can predispose to impingement.             Last Resulted: 10/14/19 12:36 PM           Assessment/Plan:     1. Acute hip pain, right  - REFERRAL TO SPORTS MEDICINE  - ibuprofen (MOTRIN) 800 MG Tab; Take 1 Tab by mouth every 8 hours as needed for Moderate Pain.  Dispense: 30 Tab; Refill: 0    2. Injury of right hip, initial encounter  - DX-HIP-COMPLETE - UNILATERAL 2+ RIGHT; Future  - REFERRAL TO SPORTS MEDICINE    X-ray of R hip ordered. Radiology report and images reviewed by myself. Per my interpretation: negative for acute process, no fracture or dislocation.    Crutches provided for improved comfort and mobility.    Discussed RICE and ibuprofen and/or acetaminophen PRN for pain.    Rx as above sent electronically for ibuprofen 800 mg tabs.    Referral given for sports medicine evaluation and treatment.    Work note provided.    Patient advised close monitoring and to: Return for 1) Symptoms don't improve or worsen, or go to ER, 2) Follow up with primary care in 7-10 days.    Differential diagnosis, natural history, supportive care, and indications for immediate follow-up discussed. All questions answered. Patient agrees with the plan of care.

## 2019-10-14 NOTE — LETTER
October 14, 2019         Patient: Jesus Reynoso   YOB: 1985   Date of Visit: 10/14/2019           To Whom it May Concern:    Jesus Reynoso was seen in my clinic on 10/14/2019. The patient may return to work 10/19/2019 or sooner if the patient is feeling better.     If you have any questions or concerns, please don't hesitate to call.        Sincerely,           MARIO Glez.  Electronically Signed

## 2019-10-21 ENCOUNTER — OFFICE VISIT (OUTPATIENT)
Dept: MEDICAL GROUP | Facility: CLINIC | Age: 34
End: 2019-10-21
Payer: COMMERCIAL

## 2019-10-21 VITALS
DIASTOLIC BLOOD PRESSURE: 76 MMHG | TEMPERATURE: 97.6 F | HEART RATE: 88 BPM | RESPIRATION RATE: 18 BRPM | HEIGHT: 75 IN | WEIGHT: 306 LBS | SYSTOLIC BLOOD PRESSURE: 118 MMHG | BODY MASS INDEX: 38.05 KG/M2 | OXYGEN SATURATION: 98 %

## 2019-10-21 DIAGNOSIS — M25.551 ACUTE HIP PAIN, RIGHT: ICD-10-CM

## 2019-10-21 PROCEDURE — 99203 OFFICE O/P NEW LOW 30 MIN: CPT | Mod: 25 | Performed by: FAMILY MEDICINE

## 2019-10-21 PROCEDURE — 76882 US LMTD JT/FCL EVL NVASC XTR: CPT | Mod: RT | Performed by: FAMILY MEDICINE

## 2019-10-21 ASSESSMENT — ENCOUNTER SYMPTOMS
NAUSEA: 0
FEVER: 0
SHORTNESS OF BREATH: 0
DIZZINESS: 0
VOMITING: 0
CHILLS: 0
HEADACHES: 0

## 2019-10-21 NOTE — PROCEDURES
Musculoskeletal ultrasound evaluation of the RIGHT anterior hip demonstrates large anechoic region, likely acute hematoma measuring roughly 7 cm x 2.7 cm

## 2019-10-21 NOTE — LETTER
October 21, 2019         Patient: Jesus Reynoso   YOB: 1985   Date of Visit: 10/21/2019           To Whom it May Concern:    Jesus Reynoso was seen in my clinic on 10/21/2019. He may return to work October 23, 2019.    If you have any questions or concerns, please don't hesitate to call.        Sincerely,           Timmy Duncan M.D.  Electronically Signed

## 2019-10-21 NOTE — PROGRESS NOTES
"Subjective:      Jesus Reynoso is a 34 y.o. male who presents with Hip Pain (Referral from UC/ R hip pain )     Referred by OZIEL Glez  for evaluation of RIGHT hip pain    HPI   RIGHT hip pain  Date of injury, Sunday, October 13, 2019  At a concert that was \"rough\" and felt a pop in the RIGHT hip  Mechanism of injury, cried was pushing up against him and he was pushing back in a running type position applying pressure with his upper body and he felt a sudden pop at the RIGHT hip  Swelling occurred within approximately  Able to walk for a while after the incident  Had too much pain to stay longer  Noticed bruising at that right abdominal region which is extended to the abdomen and down into the RIGHT thigh region  Constant, ache/sore  Initially, had issues moving around, no mobility has improved over time  Worse with too much activity  Improved with rest  Worse with applying pressure to that area and sleeping on his right side  Ibuprofen, which is helping some  Denies any prior issues with the RIGHT hip    POSITIVE history of lumbar radiculopathy for which she was seen by physiatry and placed on gabapentin which has resolved      Plays [employer] golf with his children, raising 3 children    Review of Systems   Constitutional: Negative for chills and fever.   Respiratory: Negative for shortness of breath.    Cardiovascular: Negative for chest pain.   Gastrointestinal: Negative for nausea and vomiting.   Neurological: Negative for dizziness and headaches.     PMH:  has no past medical history on file.  MEDS:   Current Outpatient Medications:   •  ibuprofen (MOTRIN) 800 MG Tab, Take 1 Tab by mouth every 8 hours as needed for Moderate Pain., Disp: 30 Tab, Rfl: 0  •  acetaminophen (TYLENOL) 500 MG Tab, Take 500-1,000 mg by mouth every 6 hours as needed., Disp: , Rfl:   •  erythromycin 5 MG/GM Ointment, Apply 1/2\" ribbon to lower lid of affected eye 3-4x's/day x 5days (Patient not " "taking: Reported on 10/14/2019), Disp: 1 Tube, Rfl: 0  •  ibuprofen (MOTRIN) 600 MG Tab, Take 600 mg by mouth every 6 hours as needed., Disp: , Rfl:   •  baclofen (LIORESAL) 10 MG Tab, Take 1 Tab by mouth 2 times a day as needed. for muscle spasm (Patient not taking: Reported on 2/19/2019), Disp: 15 Tab, Rfl: 1  •  gabapentin (NEURONTIN) 300 MG Cap, Take 1 Cap by mouth 2 times a day as needed. for nerve pain (Patient not taking: Reported on 10/21/2019), Disp: 15 Cap, Rfl: 1  ALLERGIES: No Known Allergies  SURGHX: History reviewed. No pertinent surgical history.  SOCHX:  reports that he has never smoked. He uses smokeless tobacco. He reports that he drinks alcohol. He reports that he does not use drugs.  FH: Family history was reviewed, no pertinent findings to report     Objective:     /76 (BP Location: Left arm, Patient Position: Sitting, BP Cuff Size: Large adult)   Pulse 88   Temp 36.4 °C (97.6 °F) (Temporal)   Resp 18   Ht 1.905 m (6' 3\")   Wt (!) 138.8 kg (306 lb)   SpO2 98%   BMI 38.25 kg/m²      Physical Exam     HIP EXAM:  NORMAL gait    Right hip: Range of motion is decreased with hip extension  POSITIVE pain with internal rotation  chauncey's test is NEGATIVE but does produce some anterior hip pain  NO tenderness of the trochanteric bursa  NO tenderness of the gluteus medius  Janet's test is NEGATIVE  POSITIVE palpable mass at the region of the anterior superior iliac spine on the RIGHT compared to left  With LARGE ecchymosis extending into the abdomen down the RIGHT thigh region    Left hip: Range of motion is intact  NEGATIVE pain with internal rotation  chauncey's test is NEGATIVE  NO tenderness of the trochanteric bursa  NO tenderness of the gluteus medius  Janet's test is NEGATIVE     Assessment/Plan:     1. Acute hip pain, right  MR-HIP-W/O RIGHT     Date of injury, Sunday, October 13, 2019  At a concert that was \"rough\" and felt a pop in the RIGHT hip    POSITIVE history of acute injury with " POSITIVE pop at the time of injury  POSITIVE hematoma noted on musculoskeletal ultrasound in the office  Patient has pain with active hip flexion  Suspect possible avulsion injury of the RIGHT hip    Check MRI study of the RIGHT hip  Follow-up after MRI to discuss results and further management options        10/14/2019 12:09 PM    HISTORY/REASON FOR EXAM:  Pelvic/Hip Pain Following Trauma.      TECHNIQUE/EXAM DESCRIPTION AND NUMBER OF VIEWS:  2 views of the RIGHT hip.    COMPARISON: 1/25/2019    FINDINGS:  Femoral heads are seated within the acetabula. Joint spaces are maintained. No fracture or dislocation is seen. There is mild osseous protuberance of the femoral head neck junctions which can predispose to impingement. Small os acetabuli are seen   bilaterally. There is no diastases of the symphysis pubis. Calcific densities in the pelvis likely represent phleboliths.      Impression       Small bony protuberance of the femoral head neck junctions bilaterally can predispose to impingement.        Interpreted in the office today    Thank you OZIEL Glez for allowing me to participate in caring for your patient.

## 2021-10-11 ENCOUNTER — HOSPITAL ENCOUNTER (OUTPATIENT)
Dept: RADIOLOGY | Facility: MEDICAL CENTER | Age: 36
End: 2021-10-11
Attending: PHYSICIAN ASSISTANT
Payer: COMMERCIAL

## 2021-10-11 ENCOUNTER — OFFICE VISIT (OUTPATIENT)
Dept: URGENT CARE | Facility: PHYSICIAN GROUP | Age: 36
End: 2021-10-11
Payer: COMMERCIAL

## 2021-10-11 VITALS
SYSTOLIC BLOOD PRESSURE: 140 MMHG | RESPIRATION RATE: 16 BRPM | WEIGHT: 315 LBS | HEART RATE: 87 BPM | TEMPERATURE: 98.7 F | OXYGEN SATURATION: 97 % | DIASTOLIC BLOOD PRESSURE: 92 MMHG | HEIGHT: 75 IN | BODY MASS INDEX: 39.17 KG/M2

## 2021-10-11 DIAGNOSIS — S99.922A INJURY OF LEFT ANKLE AND FOOT, INITIAL ENCOUNTER: ICD-10-CM

## 2021-10-11 DIAGNOSIS — S99.912A INJURY OF LEFT ANKLE AND FOOT, INITIAL ENCOUNTER: ICD-10-CM

## 2021-10-11 DIAGNOSIS — S93.402A SPRAIN OF LEFT ANKLE, UNSPECIFIED LIGAMENT, INITIAL ENCOUNTER: ICD-10-CM

## 2021-10-11 PROCEDURE — 99213 OFFICE O/P EST LOW 20 MIN: CPT | Performed by: PHYSICIAN ASSISTANT

## 2021-10-11 PROCEDURE — 73610 X-RAY EXAM OF ANKLE: CPT | Mod: LT

## 2021-10-11 PROCEDURE — 73630 X-RAY EXAM OF FOOT: CPT | Mod: LT

## 2021-10-11 RX ORDER — HYDROCODONE BITARTRATE AND ACETAMINOPHEN 5; 325 MG/1; MG/1
1 TABLET ORAL EVERY 4 HOURS PRN
COMMUNITY
End: 2022-07-15

## 2021-10-11 ASSESSMENT — ENCOUNTER SYMPTOMS
TINGLING: 1
INABILITY TO BEAR WEIGHT: 0
EYE REDNESS: 0
NUMBNESS: 0
LOSS OF SENSATION: 0
EYE DISCHARGE: 0
HEADACHES: 0
COUGH: 0
VOMITING: 0
FEVER: 0
NAUSEA: 0
SORE THROAT: 0
LOSS OF MOTION: 0
SHORTNESS OF BREATH: 0
MUSCLE WEAKNESS: 0

## 2021-10-11 ASSESSMENT — PAIN SCALES - GENERAL: PAINLEVEL: 5=MODERATE PAIN

## 2021-10-11 NOTE — LETTER
October 11, 2021         Patient: Jesus Reynoso   YOB: 1985   Date of Visit: 10/11/2021           To Whom it May Concern:    Jesus Reynoso was seen in my clinic on 10/11/2021. Please excuse him from work 10/11, 10/12, 10/13/ He may return to work on 10/14.    If you have any questions or concerns, please don't hesitate to call.        Sincerely,           Kelsea Isabel P.A.-C.  Electronically Signed      [employer] attempted unsuccessfully, then direct laryngoscopy successful

## 2021-10-11 NOTE — PROGRESS NOTES
Subjective     Jesus Reynoso is a 36 y.o. male who presents with Ankle Injury (left side, someone fell on it, sat horacio)            This is a new problem.  The patient presents to clinic complaining of an injury to his left ankle/foot x2 days ago.  The patient states he was attending a concert on Saturday night.  The patient states while he was standing multiple people fell into him.  The patient states he was unable to move his left lower leg out of the way, and states that a person fell onto his left ankle/foot causing an inversion injury.  The patient states he felt a pop to his left foot at the time of injury.  The patient states he developed immediate pain to his left ankle/foot following the injury.  The patient reports associated swelling and bruising of the left ankle and foot.  The patient notes intermittent tingling of the left foot.  He reports no numbness or weakness.  The patient states he is experiencing increased pain with walking.  The patient has taken OTC Motrin for his current symptoms.    Ankle Injury   Incident onset: x 2 days ago. Incident location: at a concert. The injury mechanism was an inversion injury. The pain is present in the left ankle and left foot. The pain is mild. The pain has been constant since onset. Associated symptoms include tingling (The patient reports intermittent tingling of the left foot.). Pertinent negatives include no inability to bear weight, loss of motion, loss of sensation, muscle weakness or numbness. The symptoms are aggravated by movement and weight bearing. He has tried NSAIDs for the symptoms.       PMH:  has no past medical history on file.  MEDS:   Current Outpatient Medications:   •  HYDROcodone-acetaminophen (NORCO) 5-325 MG Tab per tablet, Take 1 Tablet by mouth every four hours as needed., Disp: , Rfl:   •  ibuprofen (MOTRIN) 600 MG Tab, Take 600 mg by mouth every 6 hours as needed., Disp: , Rfl:   •  ibuprofen (MOTRIN) 800 MG Tab, Take 1 Tab by mouth  "every 8 hours as needed for Moderate Pain., Disp: 30 Tab, Rfl: 0  •  erythromycin 5 MG/GM Ointment, Apply 1/2\" ribbon to lower lid of affected eye 3-4x's/day x 5days (Patient not taking: Reported on 10/14/2019), Disp: 1 Tube, Rfl: 0  •  acetaminophen (TYLENOL) 500 MG Tab, Take 500-1,000 mg by mouth every 6 hours as needed. (Patient not taking: Reported on 10/11/2021), Disp: , Rfl:   •  baclofen (LIORESAL) 10 MG Tab, Take 1 Tab by mouth 2 times a day as needed. for muscle spasm (Patient not taking: Reported on 2/19/2019), Disp: 15 Tab, Rfl: 1  •  gabapentin (NEURONTIN) 300 MG Cap, Take 1 Cap by mouth 2 times a day as needed. for nerve pain (Patient not taking: Reported on 10/21/2019), Disp: 15 Cap, Rfl: 1  ALLERGIES: No Known Allergies  SURGHX: No past surgical history on file.  SOCHX:  reports that he has never smoked. He uses smokeless tobacco. He reports current alcohol use. He reports that he does not use drugs.  FH: Family history was reviewed, no pertinent findings to report    Review of Systems   Constitutional: Negative for fever.   HENT: Negative for congestion, ear pain and sore throat.    Eyes: Negative for discharge and redness.   Respiratory: Negative for cough and shortness of breath.    Cardiovascular: Negative for chest pain and leg swelling.   Gastrointestinal: Negative for nausea and vomiting.   Musculoskeletal: Positive for joint pain.   Skin: Negative for rash.   Neurological: Positive for tingling (The patient reports intermittent tingling of the left foot.). Negative for numbness and headaches.              Objective     /92   Pulse 87   Temp 37.1 °C (98.7 °F)   Resp 16   Ht 1.905 m (6' 3\")   Wt (!) 159 kg (350 lb)   SpO2 97%   BMI 43.75 kg/m²      Physical Exam  Constitutional:       General: He is not in acute distress.     Appearance: Normal appearance. He is well-developed. He is not ill-appearing.   HENT:      Head: Normocephalic and atraumatic.      Right Ear: External ear " normal.      Left Ear: External ear normal.   Eyes:      Extraocular Movements: Extraocular movements intact.      Conjunctiva/sclera: Conjunctivae normal.   Cardiovascular:      Rate and Rhythm: Normal rate.   Pulmonary:      Effort: Pulmonary effort is normal.   Musculoskeletal:      Cervical back: Normal range of motion and neck supple.      Comments:   Left Ankle/Foot:  Tenderness to the lateral aspect of the left ankle overlying the lateral malleolus with tenderness extending to the anterior aspect of the left ankle.  No tenderness to the medial aspect of the left ankle.  Tenderness to the lateral aspect of the left foot overlying the fifth metatarsal.  No tenderness to the medial aspect of the left foot.  No tenderness to the mid/distal aspect of the left foot.  Diffuse swelling to the left ankle and foot.  Ecchymosis is present to the lateral aspect of the left foot overlying the fifth metatarsal inferior to the lateral malleolus.  No overlying erythema.  No increased warmth.  No open wounds/abrasions.  Decreased ROM -the patient noted limited range of motion of the left ankle/foot secondary to pain/swelling  Neurovascular intact distally  Strength 5/5 -dorsiflexion/plantarflexion of the left ankle/foot against resistance  Antalgic gait   Skin:     General: Skin is warm and dry.   Neurological:      Mental Status: He is alert and oriented to person, place, and time.            Progress:  Left Ankle XR:  COMPARISON: None.     FINDINGS:  The alignment and mineralization are normal. No fractures or dislocations are appreciated. Prominent plantar calcaneal spur is identified.     IMPRESSION:  No radiographic evidence of acute traumatic bone injury.     Left Foot XR:   COMPARISON:  None.     FINDINGS:  No acute fracture is noted. There is no dislocation.  No bone erosion is noted.  There is a prominent plantar calcaneal spur.     IMPRESSION:  No acute fracture or dislocation is noted.  Prominent plantar calcaneal  gerda.     Reviewed x-ray results with the patient in clinic.    Provided the patient with an Aircast/splint for his acute ankle sprain.             Assessment & Plan          1. Injury of left ankle and foot, initial encounter  - DX-ANKLE 3+ VIEWS LEFT; Future  - DX-FOOT-COMPLETE 3+ LEFT; Future    2. Sprain of left ankle, unspecified ligament, initial encounter    Differential diagnoses, supportive care, and indications for immediate follow-up discussed with patient.   Instructed to return to clinic or nearest emergency department for any change in condition, further concerns, or worsening of symptoms.    OTC NSAIDs for pain/discomfort   RICE  Wear brace for additional support  Weight-bearing as tolerated  Follow-up with PCP   Work note provided   Return to clinic or go tot he ED if symptoms worsen or fail to improve, or if the patient should develop worsening/increasing pain/tenderness, swelling, bruising, redness or warmth to the affected area, decreased ROM, numbness, tingling or weakness, difficulty walking, fever/chills, and/or any concerning symptoms.     Discussed plan with the patient, and he agrees to the above.    I personally reviewed prior external notes and test results pertinent to today's visit.  I have independently reviewed and interpreted all diagnostics ordered during this urgent care visit.     Time spent evaluating this patient was at least 30 minutes and includes preparing for visit, obtaining history, exam and evaluation, ordering labs/tests/procedures/medications, independent interpretation, and counseling/education.    Please note that this dictation was created using voice recognition software. I have made every reasonable attempt to correct obvious errors, but I expect that there may be errors of grammar and possibly content that I did not discover before finalizing the note.       This note was electronically signed by Kelsea Isabel PA-C

## 2022-06-17 ENCOUNTER — TELEPHONE (OUTPATIENT)
Dept: SCHEDULING | Facility: IMAGING CENTER | Age: 37
End: 2022-06-17

## 2022-07-13 ENCOUNTER — APPOINTMENT (OUTPATIENT)
Dept: MEDICAL GROUP | Facility: PHYSICIAN GROUP | Age: 37
End: 2022-07-13
Payer: COMMERCIAL

## 2022-07-15 ENCOUNTER — OFFICE VISIT (OUTPATIENT)
Dept: MEDICAL GROUP | Facility: PHYSICIAN GROUP | Age: 37
End: 2022-07-15
Payer: COMMERCIAL

## 2022-07-15 VITALS
DIASTOLIC BLOOD PRESSURE: 76 MMHG | RESPIRATION RATE: 16 BRPM | TEMPERATURE: 98.2 F | HEIGHT: 75 IN | SYSTOLIC BLOOD PRESSURE: 142 MMHG | OXYGEN SATURATION: 99 % | HEART RATE: 101 BPM | BODY MASS INDEX: 38.99 KG/M2 | WEIGHT: 313.6 LBS

## 2022-07-15 DIAGNOSIS — F33.2 SEVERE EPISODE OF RECURRENT MAJOR DEPRESSIVE DISORDER, WITHOUT PSYCHOTIC FEATURES (HCC): ICD-10-CM

## 2022-07-15 DIAGNOSIS — E66.9 OBESITY (BMI 30-39.9): ICD-10-CM

## 2022-07-15 DIAGNOSIS — Z76.89 ENCOUNTER TO ESTABLISH CARE: ICD-10-CM

## 2022-07-15 DIAGNOSIS — F41.9 ANXIETY: ICD-10-CM

## 2022-07-15 DIAGNOSIS — F10.20 ALCOHOL USE DISORDER, SEVERE, DEPENDENCE (HCC): ICD-10-CM

## 2022-07-15 PROCEDURE — 99214 OFFICE O/P EST MOD 30 MIN: CPT | Performed by: STUDENT IN AN ORGANIZED HEALTH CARE EDUCATION/TRAINING PROGRAM

## 2022-07-15 RX ORDER — HYDROXYZINE PAMOATE 25 MG/1
25 CAPSULE ORAL 3 TIMES DAILY PRN
COMMUNITY
End: 2022-07-15 | Stop reason: SDUPTHER

## 2022-07-15 RX ORDER — NALTREXONE HYDROCHLORIDE 50 MG/1
50 TABLET, FILM COATED ORAL DAILY
Qty: 30 TABLET | Refills: 5 | Status: SHIPPED | OUTPATIENT
Start: 2022-07-15 | End: 2023-01-17 | Stop reason: SDUPTHER

## 2022-07-15 RX ORDER — FLUOXETINE HYDROCHLORIDE 20 MG/1
40 CAPSULE ORAL DAILY
COMMUNITY
End: 2022-07-15 | Stop reason: SDUPTHER

## 2022-07-15 RX ORDER — HYDROXYZINE PAMOATE 25 MG/1
25 CAPSULE ORAL 3 TIMES DAILY PRN
Qty: 30 CAPSULE | Refills: 5 | Status: SHIPPED | OUTPATIENT
Start: 2022-07-15

## 2022-07-15 RX ORDER — FLUOXETINE HYDROCHLORIDE 40 MG/1
40 CAPSULE ORAL DAILY
Qty: 30 CAPSULE | Refills: 11 | Status: SHIPPED | OUTPATIENT
Start: 2022-07-15 | End: 2023-06-23

## 2022-07-15 ASSESSMENT — PATIENT HEALTH QUESTIONNAIRE - PHQ9: CLINICAL INTERPRETATION OF PHQ2 SCORE: 0

## 2022-07-15 NOTE — PATIENT INSTRUCTIONS
1) Bring me a seven day blood pressure log.    2) Obtain your tdap vaccine at the pharmacy of your choice.

## 2022-07-15 NOTE — PROGRESS NOTES
Subjective:     CC:  Diagnoses of Encounter to establish care, Alcohol use disorder, severe, dependence (HCC), Severe episode of recurrent major depressive disorder, without psychotic features (HCC), Obesity (BMI 30-39.9), and Anxiety were pertinent to this visit.    HISTORY OF THE PRESENT ILLNESS: Patient is a 36 y.o. male. This pleasant patient is here today to establish care.    1.  Alcohol use disorder  2.  Depression  3.  Anxiety  Patient has a multiyear history of alcohol dependence.  He recently checked himself into inpatient rehabilitation.  He was placed on naltrexone 50 mg daily, Prozac 40 mg daily, Vistaril 25 mg 3 times daily as needed for anxiety or as a sleep aid.  He found that this regimen works really well for him.  He was discharged from the inpatient rehabilitation and it took him a while to get an appointment with primary care.  In the meantime he is run out of medications and he says that he is really feeling the effects of that.    Is here today for medication refill, establish care.    Active Diagnosis:    Patient Active Problem List   Diagnosis   • Alcohol use disorder, severe, dependence (HCC)   • Severe episode of recurrent major depressive disorder, without psychotic features (HCC)   • Obesity (BMI 30-39.9)   • Anxiety      Current Outpatient Medications Ordered in Epic   Medication Sig Dispense Refill   • fluoxetine (PROZAC) 40 MG capsule Take 1 Capsule by mouth every day. 30 Capsule 11   • naltrexone (DEPADE) 50 MG Tab Take 1 Tablet by mouth every day. 30 Tablet 5   • hydrOXYzine pamoate (VISTARIL) 25 MG Cap Take 1 Capsule by mouth 3 times a day as needed for Anxiety or Other (Or as a sleep aid.). 30 Capsule 5     No current [facility]-ordered facility-administered medications on file.     ROS:   Gen: No fevers/chills, no changes in weight  HEENT: No changes in vision/hearing, sore throat.  Pulm: No cough, unexplained SOB.  CV: No chest pain/pressure, no palpitations  GI: No nausea/vomiting,  "no diarrhea  : No dysuria/nocturia  MSk: No myalgias  Skin: No rash/skin changes  Neuro: No headaches, no numbness/tingling  Heme/Lymph: no easy bruising      Objective:     Exam: BP (!) 142/76 (BP Location: Left arm, Patient Position: Sitting, BP Cuff Size: Large adult)   Pulse (!) 101   Temp 36.8 °C (98.2 °F) (Temporal)   Resp 16   Ht 1.905 m (6' 3\")   Wt (!) 142 kg (313 lb 9.6 oz)   SpO2 99%  Body mass index is 39.2 kg/m².    General: Normal appearing. No distress.  HEENT: Normocephalic. Eyes conjunctiva clear lids without ptosis, pupils equal and reactive to light accommodation. Ears normal shape and contour, canals are clear bilaterally, tympanic membranes are benign, nasal mucosa benign, oropharynx is without erythema, edema or exudates.   Neck: Supple without JVD. Thyroid is not enlarged.  Pulmonary: Clear to ausculation.  Normal effort. No rales, ronchi, or wheezing.  Cardiovascular: Regular rate and rhythm without murmur. Radial pulses are intact and equal bilaterally.  Abdomen: Obese.  Neurologic: Grossly nonfocal.  CN II through XII intact.  Lymph: No cervical or supraclavicular lymph nodes are palpable  Skin: Warm and dry.  No obvious lesions.  Musculoskeletal: Normal gait. No extremity cyanosis, clubbing, or edema.  Psych: Normal mood and affect. Alert and oriented x3. Judgment and insight are normal.    A chaperone was offered to the patient during today's exam. Patient declined chaperone.    Labs:   No labs available.    Assessment & Plan:   36 y.o. male with the following -    1.  Alcohol use disorder  -Chronic.  -Naltrexone 50 mg daily.  Dispense 30.  Refill 5.  -Refer to behavioral health.    2.  Depression  3.  Anxiety  -Chronic.  -Prozac 40 mg daily.  Dispense 30.  Refill 11.  -Vistaril 25 mg 3 times daily as needed anxiety or as a sleep aid.  Dispense 30.  Refill 5.  -Refer to behavioral health.    4.  Encounter to establish care.  -  We discussed diet/exercise/healthy weight " maintenance.  We discussed the need to always wear seatbelt.  -I recommended the patient obtain his Tdap vaccine at the pharmacy of his choice.  -CBC  -CMP  -Hemoglobin A1c  -Vitamin D level    Return in about 3 months (around 10/15/2022).  For follow-up on #1, 2 and 3 above.  We will make sure the patient is received his Tdap vaccine.    Please note that this dictation was created using voice recognition software. I have made every reasonable attempt to correct obvious errors, but I expect that there are errors of grammar and possibly content that I did not discover before finalizing the note.      Audi Montgomery PA-C 7/15/2022

## 2023-01-12 ENCOUNTER — APPOINTMENT (OUTPATIENT)
Dept: MEDICAL GROUP | Facility: PHYSICIAN GROUP | Age: 38
End: 2023-01-12
Payer: COMMERCIAL

## 2023-01-17 ENCOUNTER — OFFICE VISIT (OUTPATIENT)
Dept: MEDICAL GROUP | Facility: PHYSICIAN GROUP | Age: 38
End: 2023-01-17
Payer: COMMERCIAL

## 2023-01-17 VITALS
DIASTOLIC BLOOD PRESSURE: 90 MMHG | HEIGHT: 75 IN | SYSTOLIC BLOOD PRESSURE: 158 MMHG | BODY MASS INDEX: 35.14 KG/M2 | WEIGHT: 282.6 LBS | TEMPERATURE: 98.6 F | HEART RATE: 128 BPM | OXYGEN SATURATION: 96 % | RESPIRATION RATE: 16 BRPM

## 2023-01-17 DIAGNOSIS — F41.9 ANXIETY: ICD-10-CM

## 2023-01-17 DIAGNOSIS — Z23 NEED FOR VACCINATION: ICD-10-CM

## 2023-01-17 DIAGNOSIS — Z11.59 ENCOUNTER FOR HEPATITIS C SCREENING TEST FOR LOW RISK PATIENT: ICD-10-CM

## 2023-01-17 DIAGNOSIS — F10.20 ALCOHOL USE DISORDER, SEVERE, DEPENDENCE (HCC): ICD-10-CM

## 2023-01-17 PROCEDURE — 99214 OFFICE O/P EST MOD 30 MIN: CPT | Mod: 25 | Performed by: STUDENT IN AN ORGANIZED HEALTH CARE EDUCATION/TRAINING PROGRAM

## 2023-01-17 PROCEDURE — 90686 IIV4 VACC NO PRSV 0.5 ML IM: CPT | Performed by: STUDENT IN AN ORGANIZED HEALTH CARE EDUCATION/TRAINING PROGRAM

## 2023-01-17 PROCEDURE — 90471 IMMUNIZATION ADMIN: CPT | Performed by: STUDENT IN AN ORGANIZED HEALTH CARE EDUCATION/TRAINING PROGRAM

## 2023-01-17 RX ORDER — PROPRANOLOL HYDROCHLORIDE 20 MG/1
20 TABLET ORAL 2 TIMES DAILY PRN
Qty: 30 TABLET | Refills: 1 | Status: SHIPPED | OUTPATIENT
Start: 2023-01-17

## 2023-01-17 RX ORDER — NALTREXONE HYDROCHLORIDE 50 MG/1
100 TABLET, FILM COATED ORAL DAILY
Qty: 60 TABLET | Refills: 5 | Status: SHIPPED | OUTPATIENT
Start: 2023-01-17

## 2023-01-17 ASSESSMENT — PATIENT HEALTH QUESTIONNAIRE - PHQ9
8. MOVING OR SPEAKING SO SLOWLY THAT OTHER PEOPLE COULD HAVE NOTICED. OR THE OPPOSITE, BEING SO FIGETY OR RESTLESS THAT YOU HAVE BEEN MOVING AROUND A LOT MORE THAN USUAL: NOT AT ALL
4. FEELING TIRED OR HAVING LITTLE ENERGY: NOT AT ALL
6. FEELING BAD ABOUT YOURSELF - OR THAT YOU ARE A FAILURE OR HAVE LET YOURSELF OR YOUR FAMILY DOWN: NOT AL ALL
SUM OF ALL RESPONSES TO PHQ QUESTIONS 1-9: 0
7. TROUBLE CONCENTRATING ON THINGS, SUCH AS READING THE NEWSPAPER OR WATCHING TELEVISION: NOT AT ALL
9. THOUGHTS THAT YOU WOULD BE BETTER OFF DEAD, OR OF HURTING YOURSELF: NOT AT ALL
SUM OF ALL RESPONSES TO PHQ9 QUESTIONS 1 AND 2: 0
3. TROUBLE FALLING OR STAYING ASLEEP OR SLEEPING TOO MUCH: NOT AT ALL
5. POOR APPETITE OR OVEREATING: NOT AT ALL
2. FEELING DOWN, DEPRESSED, IRRITABLE, OR HOPELESS: NOT AT ALL
1. LITTLE INTEREST OR PLEASURE IN DOING THINGS: NOT AT ALL

## 2023-01-18 NOTE — PROGRESS NOTES
CC:  Diagnoses of Need for vaccination, Encounter for hepatitis C screening test for low risk patient, Anxiety, and Alcohol use disorder, severe, dependence (HCC) were pertinent to this visit.    HISTORY OF THE PRESENT ILLNESS: Patient is a 37 y.o. male. This pleasant patient is here today to discuss:    1. Need for vaccination  Patient desires his influenza vaccine.    2. Encounter for hepatitis C screening test for low risk patient  Not at increased risk.  He has yet to complete his routine lab work prescribed on his initial visit.  This lab can be tacked onto previously ordered studies.    3. Anxiety  4.  Alcohol use disorder, severe, dependence (HCC)  Greatly increased anxiety and alcohol cravings recently.  Patient wishes to increase his dose of naltrexone.  He has also been having about 10 days of anxiety attacks that include flushing, sweating, sensation like he might faint/feeling off balance.  These last 15 to 20 minutes in duration and resolve with rest.  He very much feels that these are associated with anxiety.    He does not experience the symptoms when moving from laying to sitting or sitting to standing.  He does not concurrently experience any chest pain, chest pressure, palpitations.  No sensation that the room is spinning around him.    He has had a few limited failures of abstinence but overall has been doing quite well with his alcohol cessation.    Active Diagnosis:    Patient Active Problem List   Diagnosis    Alcohol use disorder, severe, dependence (HCC)    Severe episode of recurrent major depressive disorder, without psychotic features (HCC)    Obesity (BMI 30-39.9)    Anxiety      Current Outpatient Medications Ordered in Epic   Medication Sig Dispense Refill    naltrexone (DEPADE) 50 MG Tab Take 2 Tablets by mouth every day. 60 Tablet 5    propranolol (INDERAL) 20 MG Tab Take 1 Tablet by mouth 2 times a day as needed (anxiety.). 30 Tablet 1    fluoxetine (PROZAC) 40 MG capsule Take 1  "Capsule by mouth every day. 30 Capsule 11    hydrOXYzine pamoate (VISTARIL) 25 MG Cap Take 1 Capsule by mouth 3 times a day as needed for Anxiety or Other (Or as a sleep aid.). 30 Capsule 5     No current [facility]-ordered facility-administered medications on file.     ROS:   See HPI.    Objective:     Exam: BP (!) 158/90 (BP Location: Left arm, Patient Position: Sitting, BP Cuff Size: Large adult)   Pulse (!) 128   Temp 37 °C (98.6 °F) (Temporal)   Resp 16   Ht 1.905 m (6' 3\")   Wt (!) 128 kg (282 lb 9.6 oz)   SpO2 96%  Body mass index is 35.32 kg/m².    Repeat blood pressure in office 138/96.    General: Normal appearing. No distress.  Pulmonary: Clear to ausculation.  Normal effort. No rales, ronchi, or wheezing.  Cardiovascular: Regular rate and rhythm without murmur.   neurologic: Grossly nonfocal.  CN II through XII intact.  Normal coordination.  Skin: Warm and sweating.  No obvious lesions.  Musculoskeletal: No extremity cyanosis, clubbing, or edema.  Psych: Anxious mood and affect.     A chaperone was offered to the patient during today's exam. Patient declined chaperone.    Assessment & Plan:   37 y.o. male with the following -    Labs:   No pertinent labs available.    1. Need for vaccination  -Vaccine provided in clinic today.  - INFLUENZA VACCINE QUAD INJ (PF)    2. Encounter for hepatitis C screening test for low risk patient  - HCV Scrn ( [id number] 1xLife); Future    3. Anxiety  4.  Alcohol use disorder, severe, dependence (HCC)  -Chronic, in exacerbation.  -Increase naltrexone to 50 mg twice daily.  Dispense 60.  Refill 5.  -Continue the use of hydroxyzine 25 mg 3 times daily as needed.  Patient currently uses this morning and night.  -Add propranolol 20 mg twice daily as needed attacks as described above.  Dispense 30.  Refill 1.    Return in about 10 days (around 2023).  On #3 and 4 above.  Patient has been reminded to get his routine lab work done.    Please note that this dictation was " created using voice recognition software. I have made every reasonable attempt to correct obvious errors, but I expect that there are errors of grammar and possibly content that I did not discover before finalizing the note.      Audi Montgomery PA-C 1/17/2023

## 2023-06-20 ENCOUNTER — TELEPHONE (OUTPATIENT)
Dept: MEDICAL GROUP | Facility: PHYSICIAN GROUP | Age: 38
End: 2023-06-20
Payer: COMMERCIAL

## 2023-06-20 NOTE — TELEPHONE ENCOUNTER
VOICEMAIL  1. Caller Name: Jesus Reynoso                        Call Back Number: [phone]     2. Message: Pt left vm stating he has a medication question and needs help scheduling an appointment     3. Patient approves office to leave a detailed voicemail/MyChart message: N\A      Phone Number Called: [phone]     Call outcome: Spoke to patient regarding message below.    Message: Called patient back he stated he was prescribed Prozac and went to [facility] and was prescribed Lexapro states that he would like a refill of this medication as they did not give him enough refills states it was not a specific physician that prescribed him this. Made patient an appointment for 06/22/2023 if needed for this medication refill Lexapro 10mg once daily.

## 2023-06-22 ENCOUNTER — APPOINTMENT (OUTPATIENT)
Dept: MEDICAL GROUP | Facility: PHYSICIAN GROUP | Age: 38
End: 2023-06-22
Payer: COMMERCIAL

## 2023-06-22 ENCOUNTER — TELEPHONE (OUTPATIENT)
Dept: MEDICAL GROUP | Facility: PHYSICIAN GROUP | Age: 38
End: 2023-06-22

## 2023-06-22 NOTE — TELEPHONE ENCOUNTER
Message: Called patient back he stated he was prescribed Prozac and went to [facility] and was prescribed Lexapro states that he would like a refill of this medication as they did not give him enough refills states it was not a specific physician that prescribed him this. Made patient an appointment for 06/22/2023 if needed for this medication refill Lexapro 10mg once daily

## 2023-06-23 DIAGNOSIS — F33.2 SEVERE EPISODE OF RECURRENT MAJOR DEPRESSIVE DISORDER, WITHOUT PSYCHOTIC FEATURES (HCC): ICD-10-CM

## 2023-06-23 RX ORDER — ESCITALOPRAM OXALATE 10 MG/1
10 TABLET ORAL DAILY
Qty: 90 TABLET | Refills: 1 | Status: SHIPPED | OUTPATIENT
Start: 2023-06-23

## 2023-08-14 ENCOUNTER — TELEPHONE (OUTPATIENT)
Dept: HEALTH INFORMATION MANAGEMENT | Facility: OTHER | Age: 38
End: 2023-08-14
Payer: COMMERCIAL